# Patient Record
Sex: FEMALE | Race: OTHER | NOT HISPANIC OR LATINO | Employment: OTHER | URBAN - NONMETROPOLITAN AREA
[De-identification: names, ages, dates, MRNs, and addresses within clinical notes are randomized per-mention and may not be internally consistent; named-entity substitution may affect disease eponyms.]

---

## 2022-12-07 LAB — HUMAN PAPILLOMAVIRUS (HPV): POSITIVE

## 2022-12-14 ENCOUNTER — HISTORICAL (OUTPATIENT)
Dept: ADMINISTRATIVE | Facility: HOSPITAL | Age: 62
End: 2022-12-14

## 2023-01-25 ENCOUNTER — DOCUMENTATION ONLY (OUTPATIENT)
Dept: ADMINISTRATIVE | Facility: HOSPITAL | Age: 63
End: 2023-01-25
Payer: COMMERCIAL

## 2023-02-07 ENCOUNTER — TELEPHONE (OUTPATIENT)
Dept: OBSTETRICS AND GYNECOLOGY | Facility: CLINIC | Age: 63
End: 2023-02-07
Payer: COMMERCIAL

## 2023-02-07 ENCOUNTER — HOSPITAL ENCOUNTER (OUTPATIENT)
Dept: PREADMISSION TESTING | Facility: HOSPITAL | Age: 63
Discharge: HOME | End: 2023-02-07
Payer: COMMERCIAL

## 2023-02-07 VITALS — BODY MASS INDEX: 42.33 KG/M2 | WEIGHT: 230 LBS | HEIGHT: 62 IN

## 2023-02-07 DIAGNOSIS — N93.9 ABNORMAL VAGINAL BLEEDING: Primary | ICD-10-CM

## 2023-02-07 LAB
ANION GAP SERPL CALC-SCNC: 8 MEQ/L (ref 2–13)
APPEARANCE UR: CLEAR
APTT PPP: 27.9 SECONDS (ref 23–29.4)
BASOPHILS # BLD AUTO: 0.04 X10(3)/MCL (ref 0.01–0.08)
BASOPHILS NFR BLD AUTO: 0.5 % (ref 0.1–1.2)
BILIRUB UR QL STRIP.AUTO: NEGATIVE MG/DL
BUN SERPL-MCNC: 14 MG/DL (ref 7–20)
CALCIUM SERPL-MCNC: 9.6 MG/DL (ref 8.4–10.2)
CHLORIDE SERPL-SCNC: 101 MMOL/L (ref 98–110)
CO2 SERPL-SCNC: 31 MMOL/L (ref 21–32)
COLOR UR AUTO: YELLOW
CREAT SERPL-MCNC: 0.67 MG/DL (ref 0.66–1.25)
CREAT/UREA NIT SERPL: 21 (ref 12–20)
EOSINOPHIL # BLD AUTO: 0.1 X10(3)/MCL (ref 0.04–0.36)
EOSINOPHIL NFR BLD AUTO: 1.1 % (ref 0.7–7)
ERYTHROCYTE [DISTWIDTH] IN BLOOD BY AUTOMATED COUNT: 12.6 % (ref 11–14.5)
FIBRINOGEN PPP-MCNC: 443 MG/DL (ref 210–463)
GFR SERPLBLD CREATININE-BSD FMLA CKD-EPI: >90 MLS/MIN/1.73/M2
GLUCOSE SERPL-MCNC: 155 MG/DL (ref 70–115)
GLUCOSE UR QL STRIP.AUTO: NEGATIVE MG/DL
HCT VFR BLD AUTO: 42.7 % (ref 36–48)
HGB BLD-MCNC: 13.6 GM/DL (ref 11.8–16)
IMM GRANULOCYTES # BLD AUTO: 0.03 X10(3)/MCL (ref 0–0.03)
IMM GRANULOCYTES NFR BLD AUTO: 0.3 % (ref 0–0.5)
INR BLD: 0.94
KETONES UR QL STRIP.AUTO: NEGATIVE MG/DL
LEUKOCYTE ESTERASE UR QL STRIP.AUTO: NEGATIVE UNIT/L
LYMPHOCYTES # BLD AUTO: 2.18 X10(3)/MCL (ref 1.16–3.74)
LYMPHOCYTES NFR BLD AUTO: 24.9 % (ref 20–55)
MCH RBC QN AUTO: 26.5 PG (ref 27–34)
MCV RBC AUTO: 83.2 FL (ref 79–99)
MEAN CELL HEMOGLOBIN CONCENTRATION (OHS) G/DL: 31.9 G/DL (ref 31–37)
MONOCYTES # BLD AUTO: 0.39 X10(3)/MCL (ref 0.24–0.36)
MONOCYTES NFR BLD AUTO: 4.5 % (ref 4.7–12.5)
NEUTROPHILS # BLD AUTO: 6 X10(3)/MCL (ref 1.56–6.13)
NEUTROPHILS NFR BLD AUTO: 68.7 % (ref 37–73)
NITRITE UR QL STRIP.AUTO: NEGATIVE
NRBC BLD AUTO-RTO: 0 % (ref 0–1)
PH UR STRIP.AUTO: 6 [PH]
PLATELET # BLD AUTO: 323 X10(3)/MCL (ref 140–371)
PMV BLD AUTO: 10 FL (ref 9.4–12.4)
POTASSIUM SERPL-SCNC: 4.8 MMOL/L (ref 3.5–5.1)
PROT UR QL STRIP.AUTO: NEGATIVE MG/DL
PROTHROMBIN TIME: 9.6 SECONDS (ref 9.3–11.9)
RBC # BLD AUTO: 5.13 X10(6)/MCL (ref 4–5.1)
RBC #/AREA URNS AUTO: ABNORMAL /HPF
RBC UR QL AUTO: ABNORMAL UNIT/L
SODIUM SERPL-SCNC: 140 MMOL/L (ref 135–145)
SP GR UR STRIP.AUTO: 1.02
SQUAMOUS #/AREA URNS AUTO: ABNORMAL /HPF
UROBILINOGEN UR STRIP-ACNC: 0.2 MG/DL
WBC # SPEC AUTO: 8.7 X10(3)/MCL (ref 4–11.5)

## 2023-02-07 PROCEDURE — 85610 PROTHROMBIN TIME: CPT | Performed by: OBSTETRICS & GYNECOLOGY

## 2023-02-07 PROCEDURE — 85384 FIBRINOGEN ACTIVITY: CPT | Performed by: OBSTETRICS & GYNECOLOGY

## 2023-02-07 PROCEDURE — 80048 BASIC METABOLIC PNL TOTAL CA: CPT | Performed by: OBSTETRICS & GYNECOLOGY

## 2023-02-07 PROCEDURE — 85025 COMPLETE CBC W/AUTO DIFF WBC: CPT | Performed by: OBSTETRICS & GYNECOLOGY

## 2023-02-07 PROCEDURE — 81001 URINALYSIS AUTO W/SCOPE: CPT | Performed by: OBSTETRICS & GYNECOLOGY

## 2023-02-07 PROCEDURE — 87389 HIV-1 AG W/HIV-1&-2 AB AG IA: CPT | Performed by: OBSTETRICS & GYNECOLOGY

## 2023-02-07 PROCEDURE — 85730 THROMBOPLASTIN TIME PARTIAL: CPT | Performed by: OBSTETRICS & GYNECOLOGY

## 2023-02-07 PROCEDURE — 86592 SYPHILIS TEST NON-TREP QUAL: CPT | Performed by: OBSTETRICS & GYNECOLOGY

## 2023-02-07 RX ORDER — FAMOTIDINE 20 MG/1
20 TABLET, FILM COATED ORAL
Status: CANCELLED | OUTPATIENT
Start: 2023-02-16

## 2023-02-07 RX ORDER — FAMOTIDINE 10 MG/1
10 TABLET ORAL 2 TIMES DAILY
COMMUNITY

## 2023-02-07 RX ORDER — CEFAZOLIN SODIUM 2 G/50ML
2 SOLUTION INTRAVENOUS
Status: CANCELLED | OUTPATIENT
Start: 2023-02-07

## 2023-02-07 RX ORDER — SODIUM CHLORIDE, SODIUM LACTATE, POTASSIUM CHLORIDE, CALCIUM CHLORIDE 600; 310; 30; 20 MG/100ML; MG/100ML; MG/100ML; MG/100ML
INJECTION, SOLUTION INTRAVENOUS CONTINUOUS
Status: CANCELLED | OUTPATIENT
Start: 2023-02-16

## 2023-02-07 NOTE — DISCHARGE INSTRUCTIONS

## 2023-02-08 LAB — HIV 1+2 AB+HIV1 P24 AG SERPL QL IA: NONREACTIVE

## 2023-02-09 LAB
RPR SER QL: NORMAL
RPR SER-TITR: NORMAL {TITER}

## 2023-02-15 ENCOUNTER — LAB VISIT (OUTPATIENT)
Dept: LAB | Facility: HOSPITAL | Age: 63
End: 2023-02-15
Attending: OBSTETRICS & GYNECOLOGY
Payer: COMMERCIAL

## 2023-02-15 ENCOUNTER — ANESTHESIA EVENT (OUTPATIENT)
Dept: SURGERY | Facility: HOSPITAL | Age: 63
End: 2023-02-15
Payer: COMMERCIAL

## 2023-02-15 DIAGNOSIS — N93.9 ABNORMAL VAGINAL BLEEDING: ICD-10-CM

## 2023-02-15 LAB
ABO AND RH: NORMAL
ABORH RETYPE: NORMAL
ANTIBODY SCREEN: NORMAL

## 2023-02-15 PROCEDURE — 36415 COLL VENOUS BLD VENIPUNCTURE: CPT

## 2023-02-15 PROCEDURE — 86900 BLOOD TYPING SEROLOGIC ABO: CPT | Performed by: OBSTETRICS & GYNECOLOGY

## 2023-02-15 NOTE — ANESTHESIA PREPROCEDURE EVALUATION
02/15/2023  Jany Hathaway is a 62 y.o., female.      Pre-op Assessment    I have reviewed the Patient Summary Reports.     I have reviewed the Nursing Notes. I have reviewed the NPO Status.   I have reviewed the Medications.     Review of Systems  Anesthesia Hx:  No problems with previous Anesthesia  Denies Family Hx of Anesthesia complications.   Denies Personal Hx of Anesthesia complications.   Hematology/Oncology:  Hematology Normal   Oncology Normal     EENT/Dental:EENT/Dental Normal   Cardiovascular:  Cardiovascular Normal Exercise tolerance: good     Pulmonary:  Pulmonary Normal    Renal/:  Renal/ Normal     Hepatic/GI:   GERD    Musculoskeletal:  Musculoskeletal Normal    Neurological:  Neurology Normal    Endocrine:  Endocrine Normal  Morbid Obesity / BMI > 40  Dermatological:  Skin Normal    Psych:  Psychiatric Normal           Physical Exam  General: Well nourished, Cooperative, Alert and Oriented    Airway:  Mallampati: II / II  Mouth Opening: Normal  TM Distance: Normal  Tongue: Normal  Neck ROM: Normal ROM    Dental:  Intact        Anesthesia Plan  Type of Anesthesia, risks & benefits discussed:    Anesthesia Type: Gen ETT  Intra-op Monitoring Plan: Standard ASA Monitors  Post Op Pain Control Plan: multimodal analgesia  Induction:  IV  Airway Plan: Direct  Informed Consent: Informed consent signed with the Patient and all parties understand the risks and agree with anesthesia plan.  All questions answered. Patient consented to blood products? Yes  ASA Score: 3  Day of Surgery Review of History & Physical: H&P Update referred to the surgeon/provider.I have interviewed and examined the patient. I have reviewed the patient's H&P dated: There are no significant changes.     Ready For Surgery From Anesthesia Perspective.     .

## 2023-02-16 ENCOUNTER — HOSPITAL ENCOUNTER (OUTPATIENT)
Facility: HOSPITAL | Age: 63
Discharge: HOME | End: 2023-02-16
Attending: OBSTETRICS & GYNECOLOGY | Admitting: OBSTETRICS & GYNECOLOGY
Payer: COMMERCIAL

## 2023-02-16 ENCOUNTER — ANESTHESIA (OUTPATIENT)
Dept: SURGERY | Facility: HOSPITAL | Age: 63
End: 2023-02-16
Payer: COMMERCIAL

## 2023-02-16 VITALS
BODY MASS INDEX: 42.33 KG/M2 | TEMPERATURE: 97 F | HEART RATE: 66 BPM | HEIGHT: 62 IN | DIASTOLIC BLOOD PRESSURE: 78 MMHG | SYSTOLIC BLOOD PRESSURE: 122 MMHG | RESPIRATION RATE: 18 BRPM | OXYGEN SATURATION: 98 % | WEIGHT: 230 LBS

## 2023-02-16 DIAGNOSIS — R93.89 THICKENED ENDOMETRIUM: Primary | ICD-10-CM

## 2023-02-16 DIAGNOSIS — N93.9 ABNORMAL VAGINAL BLEEDING: ICD-10-CM

## 2023-02-16 PROCEDURE — 58558 HYSTEROSCOPY BIOPSY: CPT | Mod: ,,, | Performed by: OBSTETRICS & GYNECOLOGY

## 2023-02-16 PROCEDURE — D9220A PRA ANESTHESIA: ICD-10-PCS | Mod: ,,, | Performed by: NURSE ANESTHETIST, CERTIFIED REGISTERED

## 2023-02-16 PROCEDURE — 58558 PR HYSTEROSCOPY,W/ENDO BX: ICD-10-PCS | Mod: ,,, | Performed by: OBSTETRICS & GYNECOLOGY

## 2023-02-16 PROCEDURE — 71000033 HC RECOVERY, INTIAL HOUR: Performed by: OBSTETRICS & GYNECOLOGY

## 2023-02-16 PROCEDURE — 63600175 PHARM REV CODE 636 W HCPCS: Performed by: OBSTETRICS & GYNECOLOGY

## 2023-02-16 PROCEDURE — 71000015 HC POSTOP RECOV 1ST HR: Performed by: OBSTETRICS & GYNECOLOGY

## 2023-02-16 PROCEDURE — 25000003 PHARM REV CODE 250: Performed by: OBSTETRICS & GYNECOLOGY

## 2023-02-16 PROCEDURE — 36000707: Performed by: OBSTETRICS & GYNECOLOGY

## 2023-02-16 PROCEDURE — 63600175 PHARM REV CODE 636 W HCPCS: Performed by: NURSE ANESTHETIST, CERTIFIED REGISTERED

## 2023-02-16 PROCEDURE — 36000706: Performed by: OBSTETRICS & GYNECOLOGY

## 2023-02-16 PROCEDURE — 25000003 PHARM REV CODE 250: Performed by: NURSE ANESTHETIST, CERTIFIED REGISTERED

## 2023-02-16 PROCEDURE — D9220A PRA ANESTHESIA: Mod: ,,, | Performed by: NURSE ANESTHETIST, CERTIFIED REGISTERED

## 2023-02-16 PROCEDURE — C1782 MORCELLATOR: HCPCS | Performed by: OBSTETRICS & GYNECOLOGY

## 2023-02-16 PROCEDURE — 37000008 HC ANESTHESIA 1ST 15 MINUTES: Performed by: OBSTETRICS & GYNECOLOGY

## 2023-02-16 PROCEDURE — 37000009 HC ANESTHESIA EA ADD 15 MINS: Performed by: OBSTETRICS & GYNECOLOGY

## 2023-02-16 PROCEDURE — 71000016 HC POSTOP RECOV ADDL HR: Performed by: OBSTETRICS & GYNECOLOGY

## 2023-02-16 RX ORDER — DIPHENHYDRAMINE HYDROCHLORIDE 50 MG/ML
25 INJECTION INTRAMUSCULAR; INTRAVENOUS EVERY 4 HOURS PRN
Status: DISCONTINUED | OUTPATIENT
Start: 2023-02-16 | End: 2023-02-16 | Stop reason: HOSPADM

## 2023-02-16 RX ORDER — SODIUM CHLORIDE, SODIUM LACTATE, POTASSIUM CHLORIDE, CALCIUM CHLORIDE 600; 310; 30; 20 MG/100ML; MG/100ML; MG/100ML; MG/100ML
INJECTION, SOLUTION INTRAVENOUS CONTINUOUS
Status: DISCONTINUED | OUTPATIENT
Start: 2023-02-16 | End: 2023-02-16 | Stop reason: HOSPADM

## 2023-02-16 RX ORDER — LIDOCAINE HYDROCHLORIDE 20 MG/ML
INJECTION INTRAVENOUS
Status: DISCONTINUED | OUTPATIENT
Start: 2023-02-16 | End: 2023-02-16

## 2023-02-16 RX ORDER — GLYCOPYRROLATE 0.2 MG/ML
0.2 INJECTION INTRAMUSCULAR; INTRAVENOUS
Status: COMPLETED | OUTPATIENT
Start: 2023-02-16 | End: 2023-02-16

## 2023-02-16 RX ORDER — ONDANSETRON 2 MG/ML
INJECTION INTRAMUSCULAR; INTRAVENOUS
Status: DISCONTINUED | OUTPATIENT
Start: 2023-02-16 | End: 2023-02-16

## 2023-02-16 RX ORDER — PROCHLORPERAZINE EDISYLATE 5 MG/ML
5 INJECTION INTRAMUSCULAR; INTRAVENOUS EVERY 6 HOURS PRN
Status: DISCONTINUED | OUTPATIENT
Start: 2023-02-16 | End: 2023-02-16 | Stop reason: HOSPADM

## 2023-02-16 RX ORDER — FENTANYL CITRATE 50 UG/ML
INJECTION, SOLUTION INTRAMUSCULAR; INTRAVENOUS
Status: DISCONTINUED | OUTPATIENT
Start: 2023-02-16 | End: 2023-02-16

## 2023-02-16 RX ORDER — FAMOTIDINE 20 MG/1
20 TABLET, FILM COATED ORAL
Status: DISCONTINUED | OUTPATIENT
Start: 2023-02-16 | End: 2023-02-16 | Stop reason: HOSPADM

## 2023-02-16 RX ORDER — OXYCODONE AND ACETAMINOPHEN 10; 325 MG/1; MG/1
1 TABLET ORAL EVERY 4 HOURS PRN
Status: DISCONTINUED | OUTPATIENT
Start: 2023-02-16 | End: 2023-02-16 | Stop reason: HOSPADM

## 2023-02-16 RX ORDER — MIDAZOLAM HYDROCHLORIDE 1 MG/ML
2 INJECTION INTRAMUSCULAR; INTRAVENOUS
Status: COMPLETED | OUTPATIENT
Start: 2023-02-16 | End: 2023-02-16

## 2023-02-16 RX ORDER — KETOROLAC TROMETHAMINE 30 MG/ML
INJECTION, SOLUTION INTRAMUSCULAR; INTRAVENOUS
Status: DISCONTINUED | OUTPATIENT
Start: 2023-02-16 | End: 2023-02-16

## 2023-02-16 RX ORDER — PROPOFOL 10 MG/ML
VIAL (ML) INTRAVENOUS
Status: DISCONTINUED | OUTPATIENT
Start: 2023-02-16 | End: 2023-02-16

## 2023-02-16 RX ORDER — VECURONIUM BROMIDE FOR INJECTION 1 MG/ML
INJECTION, POWDER, LYOPHILIZED, FOR SOLUTION INTRAVENOUS
Status: DISCONTINUED | OUTPATIENT
Start: 2023-02-16 | End: 2023-02-16

## 2023-02-16 RX ORDER — DIPHENHYDRAMINE HCL 25 MG
25 CAPSULE ORAL EVERY 4 HOURS PRN
Status: DISCONTINUED | OUTPATIENT
Start: 2023-02-16 | End: 2023-02-16 | Stop reason: HOSPADM

## 2023-02-16 RX ORDER — HYDROMORPHONE HYDROCHLORIDE 1 MG/ML
1 INJECTION, SOLUTION INTRAMUSCULAR; INTRAVENOUS; SUBCUTANEOUS EVERY 6 HOURS PRN
Status: DISCONTINUED | OUTPATIENT
Start: 2023-02-16 | End: 2023-02-16 | Stop reason: HOSPADM

## 2023-02-16 RX ORDER — ONDANSETRON 4 MG/1
8 TABLET, ORALLY DISINTEGRATING ORAL EVERY 8 HOURS PRN
Status: DISCONTINUED | OUTPATIENT
Start: 2023-02-16 | End: 2023-02-16 | Stop reason: HOSPADM

## 2023-02-16 RX ORDER — KETAMINE HYDROCHLORIDE 10 MG/ML
INJECTION, SOLUTION INTRAMUSCULAR; INTRAVENOUS
Status: DISCONTINUED | OUTPATIENT
Start: 2023-02-16 | End: 2023-02-16

## 2023-02-16 RX ORDER — NEOSTIGMINE METHYLSULFATE 1 MG/ML
INJECTION, SOLUTION INTRAVENOUS
Status: DISCONTINUED | OUTPATIENT
Start: 2023-02-16 | End: 2023-02-16

## 2023-02-16 RX ORDER — HYDROCODONE BITARTRATE AND ACETAMINOPHEN 5; 325 MG/1; MG/1
1 TABLET ORAL EVERY 4 HOURS PRN
Status: DISCONTINUED | OUTPATIENT
Start: 2023-02-16 | End: 2023-02-16 | Stop reason: HOSPADM

## 2023-02-16 RX ORDER — CEFAZOLIN SODIUM 2 G/50ML
2 SOLUTION INTRAVENOUS
Status: COMPLETED | OUTPATIENT
Start: 2023-02-16 | End: 2023-02-16

## 2023-02-16 RX ORDER — SODIUM CHLORIDE, SODIUM LACTATE, POTASSIUM CHLORIDE, CALCIUM CHLORIDE 600; 310; 30; 20 MG/100ML; MG/100ML; MG/100ML; MG/100ML
100 INJECTION, SOLUTION INTRAVENOUS CONTINUOUS
Status: DISCONTINUED | OUTPATIENT
Start: 2023-02-16 | End: 2023-02-16 | Stop reason: HOSPADM

## 2023-02-16 RX ORDER — IBUPROFEN 600 MG/1
600 TABLET ORAL EVERY 6 HOURS PRN
Status: DISCONTINUED | OUTPATIENT
Start: 2023-02-16 | End: 2023-02-16 | Stop reason: HOSPADM

## 2023-02-16 RX ADMIN — FENTANYL CITRATE 50 MCG: 50 INJECTION, SOLUTION INTRAMUSCULAR; INTRAVENOUS at 07:02

## 2023-02-16 RX ADMIN — GLYCOPYRROLATE 0.2 MG: 0.2 INJECTION INTRAMUSCULAR; INTRAVENOUS at 06:02

## 2023-02-16 RX ADMIN — NEOSTIGMINE METHYLSULFATE 3 MG: 0.5 INJECTION INTRAVENOUS at 07:02

## 2023-02-16 RX ADMIN — CEFAZOLIN SODIUM 2 G: 2 SOLUTION INTRAVENOUS at 07:02

## 2023-02-16 RX ADMIN — FAMOTIDINE 20 MG: 20 TABLET, FILM COATED ORAL at 06:02

## 2023-02-16 RX ADMIN — SODIUM CHLORIDE, POTASSIUM CHLORIDE, SODIUM LACTATE AND CALCIUM CHLORIDE: 600; 310; 30; 20 INJECTION, SOLUTION INTRAVENOUS at 06:02

## 2023-02-16 RX ADMIN — KETAMINE HYDROCHLORIDE 10 MG: 10 INJECTION INTRAMUSCULAR; INTRAVENOUS at 07:02

## 2023-02-16 RX ADMIN — VECURONIUM BROMIDE 4.5 MG: 10 INJECTION, POWDER, FOR SOLUTION INTRAVENOUS at 06:02

## 2023-02-16 RX ADMIN — KETAMINE HYDROCHLORIDE 10 MG: 10 INJECTION INTRAMUSCULAR; INTRAVENOUS at 06:02

## 2023-02-16 RX ADMIN — ONDANSETRON 4 MG: 2 INJECTION INTRAMUSCULAR; INTRAVENOUS at 06:02

## 2023-02-16 RX ADMIN — PROPOFOL 140 MG: 10 INJECTION, EMULSION INTRAVENOUS at 06:02

## 2023-02-16 RX ADMIN — MIDAZOLAM HYDROCHLORIDE 2 MG: 1 INJECTION, SOLUTION INTRAMUSCULAR; INTRAVENOUS at 06:02

## 2023-02-16 RX ADMIN — LIDOCAINE HYDROCHLORIDE 75 MG: 20 INJECTION, SOLUTION INTRAVENOUS at 06:02

## 2023-02-16 RX ADMIN — FENTANYL CITRATE 100 MCG: 50 INJECTION, SOLUTION INTRAMUSCULAR; INTRAVENOUS at 06:02

## 2023-02-16 RX ADMIN — GLYCOPYRROLATE 0.4 MG: 0.2 INJECTION, SOLUTION INTRAMUSCULAR; INTRAVITREAL at 07:02

## 2023-02-16 RX ADMIN — KETOROLAC TROMETHAMINE 30 MG: 30 INJECTION, SOLUTION INTRAMUSCULAR; INTRAVENOUS at 07:02

## 2023-02-16 NOTE — DISCHARGE INSTRUCTIONS
DECREASED ACTIVITY TODAY, NO HEAVY LIFTING OR STRAINING, NO PUSHING OR PULLING, DON'T OPERATE MACHINERY/VEHICLE IN 24 HOURS, NO DRIVING TODAY OR WHILE TAKING PAIN MEDS, NO INTERCOURSE, DOUCHING, OR TAMPONS, SHOWERS ONLY, NO TUB BATHS, DECREASED ACTIVITY UNTIL AFTER APPOINTMENT

## 2023-02-16 NOTE — ANESTHESIA POSTPROCEDURE EVALUATION
Anesthesia Post Evaluation    Patient: Jany Hathaway    Procedure(s) Performed: Procedure(s) (LRB):  HYSTEROSCOPY, WITH DILATION AND CURETTAGE OF UTERUS (N/A)    Final Anesthesia Type: general      Patient location during evaluation: PACU  Patient participation: Yes- Able to Participate  Level of consciousness: awake and alert, awake and oriented  Post-procedure vital signs: reviewed and stable  Pain management: adequate  Airway patency: patent    PONV status at discharge: No PONV  Anesthetic complications: no      Cardiovascular status: blood pressure returned to baseline  Respiratory status: unassisted, room air and spontaneous ventilation  Hydration status: euvolemic  Follow-up not needed.          Vitals Value Taken Time   /73 02/16/23 0609   Temp 36.6 °C (97.9 °F) 02/16/23 0609   Pulse 68 02/16/23 0739   Resp 20 02/16/23 0609   SpO2 96 % 02/16/23 0739   Vitals shown include unvalidated device data.      No case tracking events are documented in the log.      Pain/Ayaz Score: No data recorded

## 2023-02-16 NOTE — BRIEF OP NOTE
[facility]  Brief Operative Note    Surgery Date: 2/16/2023     Surgeon(s) and Role:     * Isac Don MD - Primary    Assisting Surgeon: None    Pre-op Diagnosis:  Thickened endometrium [R93.89]    Post-op Diagnosis:  Post-Op Diagnosis Codes:     * Thickened endometrium [R93.89]     * Endometrial polyp [N84.0]     * Lesion of cervix [N88.9]    Procedure(s) (LRB):  HYSTEROSCOPY, WITH DILATION AND CURETTAGE OF UTERUS (N/A)    Anesthesia: General    Operative Findings: see op note    Estimated Blood Loss: * No values recorded between 2/16/2023  7:16 AM and 2/16/2023  7:33 AM *         Specimens:   Specimen (24h ago, onward)      None              Discharge Note    OUTCOME: Patient tolerated treatment/procedure well without complication and is now ready for discharge.    DISPOSITION: Home or Self Care    FINAL DIAGNOSIS:  Thickened endometrium    FOLLOWUP: In clinic    DISCHARGE INSTRUCTIONS:  No discharge procedures on file.

## 2023-02-16 NOTE — OP NOTE
DATE OF PROCEDURE: 2/16/2023    PRE-OP DIAGNOSIS:  Thickened endometrium [R93.89]    POST-OP DIAGNOSIS:  Post-Op Diagnosis Codes:     * Thickened endometrium [R93.89]     * Endometrial polyp [N84.0]     * Lesion of cervix [N88.9]    Procedure:   Hysteroscope Dilation and Curettage  Myosure Polypectomy    Surgeon: Isac Don MD    Anesthesia: General    Estimated Blood Loss:  0 cc    Findings:   Normal vagina.  Cervix poorly mobile.  Distal cervix abnormally adhesed to posterior vaginal at 6 o'clock.  Uterus sounded to 8 cm.  Large endometrial polyp protruding from anterior fundal portion of endometrium.  Otherwise relatively atrophic appearing endometrium noted.     Procedure:   After consent were reviewed, the patient was taken to the operative room and placed on the OR table.  A time out  was held and after general anesthesia was induced.    The patient was placed in the dorsal lithotomy position and prepped and draped in the standard sterile fashion.  A weighted speculum was placed in the posterior vagina.  The cervix was identified and grasped with a single tooth tenaculum.  The uterus was sounded to 8 cm.  The cervix was then dilated to allow passage of a rigid hysteroscope.  The scope was inserted.  The above findings were noted.  Due to its location in the fundus, a Myosure Light device was used to removed the large endometrial polyp in its entirety.  The remainder of the endometrium was sampled with the Myosure as well.  An endocervical curetting was performed and handed off separately. A biopsy was taken of the posterior cervical adhesion and the remainder of the adhesion was released.     At that point, the procedure was terminated.  All instruments were removed.  Counts were correct.  Patient was awakened and taken to the recovery room in stable condition having tolerated the procedure well.

## 2023-02-16 NOTE — DISCHARGE SUMMARY
[facility]  Discharge Note  Short Stay    Procedure(s) (LRB):  HYSTEROSCOPY, WITH DILATION AND CURETTAGE OF UTERUS (N/A)      OUTCOME: Patient tolerated treatment/procedure well without complication and is now ready for discharge.    DISPOSITION: Home or Self Care    FINAL DIAGNOSIS:  Thickened endometrium    FOLLOWUP: In clinic    DISCHARGE INSTRUCTIONS:    Discharge Procedure Orders   Diet general     Call MD for:  temperature >100.4     Call MD for:  persistent nausea and vomiting     Call MD for:  severe uncontrolled pain     Call MD for:  difficulty breathing, headache or visual disturbances     Call MD for:  redness, tenderness, or signs of infection (pain, swelling, redness, odor or green/yellow discharge around incision site)     Call MD for:  hives     Call MD for:  persistent dizziness or light-headedness     Call MD for:  extreme fatigue        TIME SPENT ON DISCHARGE: 5 minutes

## 2023-02-16 NOTE — ANESTHESIA PROCEDURE NOTES
Intubation    Date/Time: 2/16/2023 6:58 AM  Performed by: Zak Carrasquillo CRNA  Authorized by: Zak Carrasquillo CRNA     Intubation:     Induction:  Intravenous    Intubated:  Postinduction    Mask Ventilation:  Easy mask    Attempts:  1    Attempted By:  CRNA    Method of Intubation:  Direct    Blade:  Sharp 3    Laryngeal View Grade: Grade I - full view of cords      Difficult Airway Encountered?: No      Complications:  None    Airway Device:  Oral endotracheal tube    Airway Device Size:  7.0    Style/Cuff Inflation:  Cuffed    Tube secured:  21    Secured at:  The lips    Placement Verified By:  Capnometry    Complicating Factors:  None    Findings Post-Intubation:  BS equal bilateral and atraumatic/condition of teeth unchanged

## 2023-03-03 ENCOUNTER — OFFICE VISIT (OUTPATIENT)
Dept: OBSTETRICS AND GYNECOLOGY | Facility: CLINIC | Age: 63
End: 2023-03-03
Payer: COMMERCIAL

## 2023-03-03 VITALS
HEIGHT: 62 IN | DIASTOLIC BLOOD PRESSURE: 68 MMHG | WEIGHT: 226.88 LBS | TEMPERATURE: 98 F | BODY MASS INDEX: 41.75 KG/M2 | SYSTOLIC BLOOD PRESSURE: 122 MMHG

## 2023-03-03 DIAGNOSIS — N84.0 ENDOMETRIAL POLYP: ICD-10-CM

## 2023-03-03 PROCEDURE — 3008F BODY MASS INDEX DOCD: CPT | Mod: CPTII,,, | Performed by: OBSTETRICS & GYNECOLOGY

## 2023-03-03 PROCEDURE — 99213 PR OFFICE/OUTPT VISIT, EST, LEVL III, 20-29 MIN: ICD-10-PCS | Mod: ,,, | Performed by: OBSTETRICS & GYNECOLOGY

## 2023-03-03 PROCEDURE — 3074F PR MOST RECENT SYSTOLIC BLOOD PRESSURE < 130 MM HG: ICD-10-PCS | Mod: CPTII,,, | Performed by: OBSTETRICS & GYNECOLOGY

## 2023-03-03 PROCEDURE — 99213 OFFICE O/P EST LOW 20 MIN: CPT | Mod: ,,, | Performed by: OBSTETRICS & GYNECOLOGY

## 2023-03-03 PROCEDURE — 3078F PR MOST RECENT DIASTOLIC BLOOD PRESSURE < 80 MM HG: ICD-10-PCS | Mod: CPTII,,, | Performed by: OBSTETRICS & GYNECOLOGY

## 2023-03-03 PROCEDURE — 1159F PR MEDICATION LIST DOCUMENTED IN MEDICAL RECORD: ICD-10-PCS | Mod: CPTII,,, | Performed by: OBSTETRICS & GYNECOLOGY

## 2023-03-03 PROCEDURE — 3074F SYST BP LT 130 MM HG: CPT | Mod: CPTII,,, | Performed by: OBSTETRICS & GYNECOLOGY

## 2023-03-03 PROCEDURE — 3078F DIAST BP <80 MM HG: CPT | Mod: CPTII,,, | Performed by: OBSTETRICS & GYNECOLOGY

## 2023-03-03 PROCEDURE — 1159F MED LIST DOCD IN RCRD: CPT | Mod: CPTII,,, | Performed by: OBSTETRICS & GYNECOLOGY

## 2023-03-03 PROCEDURE — 3008F PR BODY MASS INDEX (BMI) DOCUMENTED: ICD-10-PCS | Mod: CPTII,,, | Performed by: OBSTETRICS & GYNECOLOGY

## 2023-03-03 RX ORDER — MEDROXYPROGESTERONE ACETATE 10 MG/1
10 TABLET ORAL DAILY
Qty: 10 TABLET | Refills: 2 | Status: SHIPPED | OUTPATIENT
Start: 2023-03-03 | End: 2024-01-09

## 2023-03-03 NOTE — PROGRESS NOTES
HPI:   62 y.o. F s/p Hyst D&C on 2023 for Thickened endometrium here for postop visit.     Pathology:   Endometrium and polyp:  Endometrial polyp, benign  Endocervix:  Scant benign endocervical epithelium and benign squamous epithelial cells  No definitive dysplastic cells identified  Cervix biopsy at 6:00:  Benign squamous mucosa  Negative for dysplasia       Per previous note:  Chief Complaint   Follow up ECC results. Pre-admit GYN. History of Present Illness 61yo WF  here today for pathology results and Pre admit for hyst d&C for thickened ES.    Pathology:  ECC:  Endocervical curettage:  Very few benign squamous epithelial cells  no definitive dysplastic cells identified    23:  Chief Complaint  colpo..last pap 22=ascus + HR hpv  History of Present Illness  61yo WF here today for Colpo due to ASCUS PAP +HR HPV  Discussion of TVUS, MMG, Breast U/S    22 TVUS  US pelvis:  - Uterus: 7.3 x 4.0 x 4.9  - ES:2.1cm  - ROV:1.6 x 1.5 x 0.6cm  - LOV:2.2 x 1.0 x 1.2cm  - Free fluid: none noted  22 MMG:  Subtle asymmetric stromal thickening is noted within the upper-outer quadrant of the right breast and correlated fairly well in location with the suspicious nodule noted within the 9 o'clock position of the right breast on the ultrasound also dated 22. there is no obvious, discrete mass noted mammographically as described on the patient's ultrasound  Summary findings:  BI-RADS 4 suspicious for malignancy (much more clearly delineated on the ultrasound of the right breast also dated 22)  Recommendations:  Surgical consultation is recommended. It should be noted that the lesion in questions is much more clearly delineated on ultrasound and ultrasound-guided needle or a mag seed localization prior to surgical excision could be performed if felt to be clinically indicated.  Ultrasound of right breast:  Impression:  1.1cm densely echogenic nodule resulting in intense posterior shadowing  "(taller than wide nodule) is noted within the 9 o'clock position of the right breast with no obvious, corresponding lesion noted on the patient mammogram dated 12/14/22. this finding is suspicious for a breast cancer (BI-RADS category 4/suspicious abnormality) and surgical consultation is recommended. it should be noted that mag seed or wire localization of this nodule could easily be performed under ultrasound guidance for surgical resection    ROS:  General/Constitutional:  Chills denies. Fatigue/weakness denies. Fever denies . Night sweats denies .  Gastrointestinal:  Abdominal pain denies. Blood in stool denies. Constipation denies. Diarrhea denies. Heartburn denies. Nausea denies. Vomiting denies  Genitourinary:  Incontinence denies. Blood in urine denies. Frequent urination denies. Urgency denies. Painful urination denies.  Gynecologic:  Irregular menses denies. Heavy bleeding denies. Painful menses denies. Vaginal discharge denies. Vaginal odor denies. Vaginal lesion denies. Pelvic pain denies. Decreased libido denies. Vulvar lesion denies. Prolapse of genital organs denies. Painful intercourse denies.    Physical Exam:   /68   Temp 97.7 °F (36.5 °C)   Ht 5' 1.81" (1.57 m)   Wt 102.9 kg (226 lb 13.7 oz)   BMI 41.75 kg/m²       Assessment:   1. Endometrial polyp    Hysteroscopy, With Dilation And Curettage Of Uterus       Plan:   Reviewed pathology  May return to normal activity  Recommendation of cyclic provera x3 months. If absent bleeding following 3 months, will discontinue medication. Denies hx of DVT.   Rx: Provera 10mg x10 days to begin April 1st   To keep journal of bleeding patterns   Continue healthy diet and exercise.   Discussed weight additional risk factor with possible future hysterectomy.     RTC 4 months for medication f/u   "

## 2023-04-18 ENCOUNTER — TELEPHONE (OUTPATIENT)
Dept: OBSTETRICS AND GYNECOLOGY | Facility: CLINIC | Age: 63
End: 2023-04-18
Payer: COMMERCIAL

## 2023-04-18 NOTE — TELEPHONE ENCOUNTER
----- Message from Tootie Modi sent at 4/18/2023  9:21 AM CDT -----  Regarding: Nurse call  .Type:  Needs Medical Advice    Who Called: patient  Best Call Back Number:  [phone]  Additional Information: came to clinic wanting to know why she was prescribed medroxyProgesterone; read her last visit to her but still has questions for nurse

## 2023-12-27 NOTE — PROGRESS NOTES
Chief Complaint: Annual exam    HPI:   Jany Hathaway is a 63 y.o. year old  here for her Annual Exam.  H/o endometrial polyp noted on D&C last year.  Polyp was initially identified incidentally due to thickened ES of 2.1 cm on pelvic US performed due to family history (sister) of ovarian cancer.  She was given cyclic provera and was to follow up in 3-4 months, but was lost to follow until now.  She reports withdrawal bleeding each month after provera, but has not taken it in 6 months since her rx .  Denies bleeding since last withdrawal bleed.    Sister dx with ovarian cancer in her late 30s.       She has h/o GEORGETTE 3 and LEEP in 2017.  Pap in 2022 was ASCUS + HR HPV with benign colpo.     MENOPAUSAL:  Age at menarche: pt does not remember  Age at menopause: 50  Hysterectomy:  No  Last pap: 22 ASCUS w/+HPV Colpo 23  H/o abnl pap: yes  Postcoital Bleeding: No  Sexually Active: not currently   Dyspareunia: No  H/o STI: HPV  HRT: none  MM22 benign  H/o abnl MMG: no  Col          Past Medical History:   Diagnosis Date    Abnormal Pap smear of cervix     GERD (gastroesophageal reflux disease)      Past Surgical History:   Procedure Laterality Date    BILATERAL TUBAL LIGATION Bilateral     CERVICAL BIOPSY  W/ LOOP ELECTRODE EXCISION      HYSTEROSCOPY WITH DILATION AND CURETTAGE OF UTERUS N/A 2023    Procedure: HYSTEROSCOPY, WITH DILATION AND CURETTAGE OF UTERUS;  Surgeon: Isac Don MD;  Location: [facility] OR;  Service: OB/GYN;  Laterality: N/A;       Current Outpatient Medications:     famotidine (PEPCID) 10 MG tablet, Take 10 mg by mouth 2 (two) times daily., Disp: , Rfl:     medroxyPROGESTERone (PROVERA) 10 MG tablet, Take 1 tablet (10 mg total) by mouth once daily. for 10 days, Disp: 10 tablet, Rfl: 2  Review of patient's allergies indicates:  No Known Allergies  OB History    Para Term  AB Living   1 1 1     1   SAB IAB Ectopic Multiple Live Births           1       # Outcome Date GA Lbr Migel/2nd Weight Sex Delivery Anes PTL Lv   1 Term 08/11/80 40w0d  3.118 kg (6 lb 14 oz) F Vag-Spont None  JUSTO     Social History     Tobacco Use    Smoking status: Never    Smokeless tobacco: Never   Substance Use Topics    Alcohol use: Never    Drug use: Never     Family History   Problem Relation Age of Onset    Heart failure Mother     Pancreatic cancer Sister     Ovarian cancer Sister     Breast cancer Sister        Review of Systems:   Review of Systems   Constitutional:  Negative for appetite change, chills, fatigue, fever and unexpected weight change.   Eyes:  Negative for visual disturbance.   Respiratory:  Negative for cough, shortness of breath and wheezing.    Cardiovascular:  Negative for chest pain, palpitations and leg swelling.   Gastrointestinal:  Negative for abdominal pain, bloating, blood in stool, constipation, diarrhea, nausea, vomiting, reflux and fecal incontinence.   Endocrine: Negative for hair loss and hot flashes.   Genitourinary:  Negative for bladder incontinence, decreased libido, dysmenorrhea, dyspareunia, dysuria, flank pain, frequency, genital sores, hematuria, hot flashes, menorrhagia, menstrual problem, pelvic pain, urgency, vaginal bleeding, vaginal discharge, vaginal pain, urinary incontinence, postcoital bleeding, postmenopausal bleeding, vaginal dryness and vaginal odor.   Integumentary:  Negative for rash, acne, hair changes, breast mass, nipple discharge, breast skin changes and breast tenderness.   Neurological:  Negative for headaches.   Psychiatric/Behavioral:  Negative for depression.    Breast: Negative for asymmetry, breast self exam, lump, mass, mastodynia, nipple discharge, skin changes and tenderness       Physical Exam:  /64 (BP Location: Right arm, Patient Position: Sitting, BP Method: Medium (Manual))   Temp 96.8 °F (36 °C) (Temporal)   Wt 99.1 kg (218 lb 7.6 oz)   BMI 40.20 kg/m²       Physical Exam:   Constitutional: She is  oriented to person, place, and time. She appears well-developed and well-nourished.   Obese, BMI 40    HENT:   Head: Normocephalic.      Cardiovascular:       Exam reveals no edema.        Pulmonary/Chest: Effort normal. She exhibits no mass, no tenderness, no bony tenderness, no deformity and no retraction. Right breast exhibits no inverted nipple, no mass, no nipple discharge, no skin change, no tenderness, no bleeding, no swelling, no mastectomy, no augmentation and no lumpectomy. Left breast exhibits no inverted nipple, no mass, no nipple discharge, no skin change, no tenderness, no bleeding, no swelling, no mastectomy, no augmentation and no lumpectomy. Breasts are symmetrical.        Abdominal: Soft. She exhibits no distension and no mass. There is no abdominal tenderness. There is no rebound and no guarding. No hernia. Hernia confirmed negative in the right inguinal area.     Genitourinary:    Inguinal canal, vagina, uterus, right adnexa, left adnexa and rectum normal.   Rectum:      No anal fissure or external hemorrhoid.   The external female genitalia was normal.   No external genitalia lesions identified,Genitalia hair distrobution normal .     Labial bartholins normal.There is no rash, tenderness, lesion or injury on the right labia. There is no rash, tenderness, lesion or injury on the left labia. Cervix is normal. No no masses or organomegaly. Right adnexum displays no mass, no tenderness and no fullness. Left adnexum displays no mass, no tenderness and no fullness. Vagina exhibits no lesion. No erythema, vaginal discharge, tenderness, bleeding, rectocele, cystocele or prolapse of vaginal walls in the vagina.    No foreign body in the vagina.      No signs of injury in the vagina.   Vagina was moist.Cervix exhibits no motion tenderness, no lesion, no discharge, no friability, no tenderness and no polyp. Uterus consistancy normal and Uerus contour normal  Uterus is not deviated, not enlarged, not fixed,  not tender, not hosting fibroids and no uterine prolapse. Normal urethral meatus.Urethral Meatus exhibits: urethral lesionUrethra findings: no urethral mass, no tenderness and prolapsedBladder findings: no bladder tenderness          Musculoskeletal: Normal range of motion.      Lymphadenopathy: No inguinal adenopathy noted on the right or left side.    Neurological: She is alert and oriented to person, place, and time.    Skin: Skin is warm and dry.    Psychiatric: She has a normal mood and affect. Her behavior is normal. Judgment and thought content normal.        Assessment:   Annual Well Women Exam  1. Encounter for well woman exam with abnormal findings  - Liquid-Based Pap Smear, Screening Screening    2. Endometrial polyp  - US Pelvis Comp with Transvag NON-OB (xpd; Future  - Liquid-Based Pap Smear, Screening Screening    3. BMI 40.0-44.9, adult    4. History of loop electrical excision procedure (LEEP)        Plan:  Pap Done w HPV  Breast Self-awareness  Recommend annual mammogram  Keep f/u with breast specialist  Recommend exercise at least 3 times weekly  Healthy, balanced diet  Keep yearly follow up with PCP   Will repeat TVUS to reassess ES. If still thickened, will required either progestin therapy or surgical management for endometrial protection  RTC 2 weeks

## 2023-12-28 ENCOUNTER — OFFICE VISIT (OUTPATIENT)
Dept: OBSTETRICS AND GYNECOLOGY | Facility: CLINIC | Age: 63
End: 2023-12-28
Payer: COMMERCIAL

## 2023-12-28 VITALS
DIASTOLIC BLOOD PRESSURE: 64 MMHG | WEIGHT: 218.5 LBS | TEMPERATURE: 97 F | BODY MASS INDEX: 40.2 KG/M2 | SYSTOLIC BLOOD PRESSURE: 122 MMHG

## 2023-12-28 DIAGNOSIS — N84.0 ENDOMETRIAL POLYP: ICD-10-CM

## 2023-12-28 DIAGNOSIS — Z98.890 HISTORY OF LOOP ELECTRICAL EXCISION PROCEDURE (LEEP): ICD-10-CM

## 2023-12-28 DIAGNOSIS — Z01.411 ENCOUNTER FOR WELL WOMAN EXAM WITH ABNORMAL FINDINGS: Primary | ICD-10-CM

## 2023-12-28 PROCEDURE — 99396 PREV VISIT EST AGE 40-64: CPT | Mod: ,,, | Performed by: OBSTETRICS & GYNECOLOGY

## 2023-12-28 PROCEDURE — 99396 PR PREVENTIVE VISIT,EST,40-64: ICD-10-PCS | Mod: ,,, | Performed by: OBSTETRICS & GYNECOLOGY

## 2023-12-28 PROCEDURE — 3078F PR MOST RECENT DIASTOLIC BLOOD PRESSURE < 80 MM HG: ICD-10-PCS | Mod: CPTII,,, | Performed by: OBSTETRICS & GYNECOLOGY

## 2023-12-28 PROCEDURE — 1159F MED LIST DOCD IN RCRD: CPT | Mod: CPTII,,, | Performed by: OBSTETRICS & GYNECOLOGY

## 2023-12-28 PROCEDURE — 3074F SYST BP LT 130 MM HG: CPT | Mod: CPTII,,, | Performed by: OBSTETRICS & GYNECOLOGY

## 2023-12-28 PROCEDURE — 3074F PR MOST RECENT SYSTOLIC BLOOD PRESSURE < 130 MM HG: ICD-10-PCS | Mod: CPTII,,, | Performed by: OBSTETRICS & GYNECOLOGY

## 2023-12-28 PROCEDURE — 1159F PR MEDICATION LIST DOCUMENTED IN MEDICAL RECORD: ICD-10-PCS | Mod: CPTII,,, | Performed by: OBSTETRICS & GYNECOLOGY

## 2023-12-28 PROCEDURE — 3008F PR BODY MASS INDEX (BMI) DOCUMENTED: ICD-10-PCS | Mod: CPTII,,, | Performed by: OBSTETRICS & GYNECOLOGY

## 2023-12-28 PROCEDURE — 3078F DIAST BP <80 MM HG: CPT | Mod: CPTII,,, | Performed by: OBSTETRICS & GYNECOLOGY

## 2023-12-28 PROCEDURE — 3008F BODY MASS INDEX DOCD: CPT | Mod: CPTII,,, | Performed by: OBSTETRICS & GYNECOLOGY

## 2024-01-02 ENCOUNTER — TELEPHONE (OUTPATIENT)
Dept: OBSTETRICS AND GYNECOLOGY | Facility: CLINIC | Age: 64
End: 2024-01-02
Payer: MEDICAID

## 2024-01-02 NOTE — TELEPHONE ENCOUNTER
"Per Dr. Don "Pap ordered. Used endocervical brush to ensure I had a sufficient specimen." Notified Carolina. Carolina verbalized understanding.  "

## 2024-01-04 LAB — PSYCHE PATHOLOGY RESULT: NORMAL

## 2024-01-05 ENCOUNTER — HOSPITAL ENCOUNTER (OUTPATIENT)
Dept: RADIOLOGY | Facility: HOSPITAL | Age: 64
Discharge: HOME | End: 2024-01-05
Attending: OBSTETRICS & GYNECOLOGY
Payer: MEDICAID

## 2024-01-05 DIAGNOSIS — N84.0 ENDOMETRIAL POLYP: ICD-10-CM

## 2024-01-05 PROCEDURE — 76830 TRANSVAGINAL US NON-OB: CPT | Mod: TC

## 2024-01-09 ENCOUNTER — OFFICE VISIT (OUTPATIENT)
Dept: OBSTETRICS AND GYNECOLOGY | Facility: CLINIC | Age: 64
End: 2024-01-09
Payer: MEDICAID

## 2024-01-09 VITALS
SYSTOLIC BLOOD PRESSURE: 126 MMHG | TEMPERATURE: 97 F | BODY MASS INDEX: 40.16 KG/M2 | WEIGHT: 218.25 LBS | DIASTOLIC BLOOD PRESSURE: 68 MMHG

## 2024-01-09 DIAGNOSIS — R93.89 THICKENED ENDOMETRIUM: Primary | ICD-10-CM

## 2024-01-09 PROCEDURE — 99213 OFFICE O/P EST LOW 20 MIN: CPT | Mod: ,,, | Performed by: OBSTETRICS & GYNECOLOGY

## 2024-01-09 PROCEDURE — 3078F DIAST BP <80 MM HG: CPT | Mod: CPTII,,, | Performed by: OBSTETRICS & GYNECOLOGY

## 2024-01-09 PROCEDURE — 3008F BODY MASS INDEX DOCD: CPT | Mod: CPTII,,, | Performed by: OBSTETRICS & GYNECOLOGY

## 2024-01-09 PROCEDURE — 3074F SYST BP LT 130 MM HG: CPT | Mod: CPTII,,, | Performed by: OBSTETRICS & GYNECOLOGY

## 2024-01-09 RX ORDER — MEDROXYPROGESTERONE ACETATE 10 MG/1
10 TABLET ORAL DAILY
Qty: 10 TABLET | Refills: 3 | Status: SHIPPED | OUTPATIENT
Start: 2024-01-09

## 2024-01-09 NOTE — PROGRESS NOTES
Chief Complaint     Follow-up (F/u on u/s done on 24)    HPI:     Patient is a 63 y.o.  presents to follow up PAP, ultrasound for endometrial polyp.     MENOPAUSAL:  Age at menarche: pt does not remember  Age at menopause: 50  Hysterectomy:  No  Last pap: 23 neg w/ neg HPV  H/o abnl pap: yes  Postcoital Bleeding: No  Sexually Active: not currently   Dyspareunia: No  H/o STI: HPV  HRT: none  MM22 benign  H/o abnl MMG: no  Colonoscopy:  never    23:  PAP: WNL, negative HPV     24:  FINDINGS:  Uterus: The uterus measures 6.7 x 3.6 x 4.5 cm with the myometrium having a mildly heterogeneous echogenic appearance with no worrisome underlying masses.  The endometrial stripe measures 6 mm in AP thickness.  Ovaries: The right ovary measures 2.0 x 1.5 x 1.8 cm in the left ovary measures 1.8 x 1.8 x 1.6 cm with no worrisome ovarian or adnexal abnormalities appreciated.  Miscellaneous: There is no evidence of free fluid within the pelvis and the patient was not tender during the examination.  Impression:  1. No significant abnormalities are noted.  See above comments.    Per previous note 23:  Jany Hathaway is a 63 y.o. year old  here for her Annual Exam.  H/o endometrial polyp noted on D&C last year.  Polyp was initially identified incidentally due to thickened ES of 2.1 cm on pelvic US performed due to family history (sister) of ovarian cancer.  She was given cyclic provera and was to follow up in 3-4 months, but was lost to follow until now.  She reports withdrawal bleeding each month after provera, but has not taken it in 6 months since her rx .  Denies bleeding since last withdrawal bleed.  Sister dx with ovarian cancer in her late 30s.   She has h/o GEORGETTE 3 and LEEP in 2017.  Pap in 2022 was ASCUS + HR HPV with benign colpo.     Past Medical History:   Diagnosis Date    Abnormal Pap smear of cervix     GERD (gastroesophageal reflux disease)     High grade  squamous intraepithelial lesion (HGSIL), grade 3 GOERGETTE, on biopsy of cervix     Injury to breast     Lump of right breast        Past Surgical History:   Procedure Laterality Date    BILATERAL TUBAL LIGATION Bilateral 1995    CERVICAL BIOPSY  W/ LOOP ELECTRODE EXCISION  2017    COLPOSCOPY  2023    [facility] biopsy of cervix  2017    Endocervical biopsy  2017    HYSTEROSCOPY WITH DILATION AND CURETTAGE OF UTERUS N/A 2023    Procedure: HYSTEROSCOPY, WITH DILATION AND CURETTAGE OF UTERUS;  Surgeon: Isac Don MD;  Location: [facility] OR;  Service: OB/GYN;  Laterality: N/A;       Family History   Problem Relation Age of Onset    Heart failure Mother     Pancreatic cancer Sister     Ovarian cancer Sister     Breast cancer Sister     Colon cancer Neg Hx     Cervical cancer Neg Hx        OB History          1    Para   1    Term   1            AB        Living   1         SAB        IAB        Ectopic        Multiple        Live Births   1                 Current Outpatient Medications on File Prior to Visit   Medication Sig Dispense Refill    famotidine (PEPCID) 10 MG tablet Take 10 mg by mouth 2 (two) times daily.      [DISCONTINUED] medroxyPROGESTERone (PROVERA) 10 MG tablet Take 1 tablet (10 mg total) by mouth once daily. for 10 days 10 tablet 2     No current facility-administered medications on file prior to visit.       Review of Systems:       Review of Systems   Constitutional:  Negative for chills and fever.   Gastrointestinal:  Negative for abdominal pain, constipation and diarrhea.   Genitourinary:  Negative for bladder incontinence, decreased libido, dysmenorrhea, dyspareunia, dysuria, flank pain, frequency, genital sores, hematuria, hot flashes, menorrhagia, menstrual problem, pelvic pain, urgency, vaginal bleeding, vaginal discharge, vaginal pain, urinary incontinence, postcoital bleeding, postmenopausal bleeding, vaginal dryness and vaginal odor.        Physical Exam:     /68 (BP Location: Right arm, Patient Position: Sitting, BP Method: Large (Manual))   Temp 96.6 °F (35.9 °C) (Temporal)   Wt 99 kg (218 lb 4.1 oz)   BMI 40.16 kg/m²     Physical Exam   Deferred     Assessment:   1. Thickened endometrium    Other orders  -     Cancel: Specimen to Pathology Gynecology and Obstetrics  -     medroxyPROGESTERone (PROVERA) 10 MG tablet; Take 1 tablet (10 mg total) by mouth once daily.  Dispense: 10 tablet; Refill: 3             Plan:   1. Thickened endometrium    Pelvic u/s, PAP reviewed with patient   Recommend progestin for endometrial protection  Discussed cyclic provera vs continuous progestin    Pt desires: Rx Provera 10mg x10 days. Will repeat provera quarterly x1 year.   RTC 6 months for f/u

## 2025-01-06 NOTE — PROGRESS NOTES
Chief Complaint: Annual exam    Chief Complaint   Patient presents with    Well Woman     Here for annual GYN exam.       HPI:   Jany Hathaway is a 64 y.o. year old  here for her Annual Exam.  She underwent hysteroscope D&C for endometrial polyp with pathology showing proliferative endometrium in in 2023.  She was placed on cyclic Provera which she took every 3 months twice.  She was follow up in 6 months but was not able to make that appointment.  She did have withdrawal bleeding after each episode of cyclic Provera.  She has had no bleeding since.    Today she complains of pain in her left shoulder due to a fall last year.  Requests referral to orthopedics.  Also requests referral to Stephanie Ernandez for primary care.    Gyn History:    Menstrual History  Cycle: No  Menarche Age: 14 years    Menopause  Menopause Age: 50 years  Post Menopausal Bleeding: No  Hormone Replacement Therapy: No    Pap History  Last pap date: 23  Result: Normal (NIL, Negative HPV)  History of Abnormal Pap: (!) Yes (Pap 2022 ASCUS +HR HPV with benign colpo. HX OF CIN3; LEEP 2017)  Treatment used: Colpo, LEEP  HPV Vaccine Completed: No    [place]  Sexually Active: No  STI History: No  Contraception: No    Breast History  Last Breast Imaging Date: Yes  Date: 22 (MMG & US; Referred to Dr. Alvarez; WNL per pt)  History of Abnormal Breast Imaging : No  History of Breast Biopsy: No      Past Medical History:   Diagnosis Date    Abnormal Pap smear of cervix     GERD (gastroesophageal reflux disease)     High grade squamous intraepithelial lesion (HGSIL), grade 3 GEORGETTE, on biopsy of cervix     Injury to breast     Lump of right breast      Past Surgical History:   Procedure Laterality Date    BILATERAL TUBAL LIGATION Bilateral 1995    CERVICAL BIOPSY  W/ LOOP ELECTRODE EXCISION  2017    COLPOSCOPY  2023    Dr. Isac Don    Cone biopsy of cervix Bilateral 2017    Dr. Gonzalo Don     Endocervical biopsy  2017    Dr. Gonzalo Don    HYSTEROSCOPY WITH DILATION AND CURETTAGE OF UTERUS N/A 2023    Procedure: HYSTEROSCOPY, WITH DILATION AND CURETTAGE OF UTERUS;  Surgeon: Isac Don MD;  Location: [facility];  Service: OB/GYN;  Laterality: N/A;       Current Outpatient Medications:     famotidine (PEPCID) 10 MG tablet, Take 10 mg by mouth 2 (two) times daily., Disp: , Rfl:   Review of patient's allergies indicates:  No Known Allergies  OB History    Para Term  AB Living   1 1 1     1   SAB IAB Ectopic Multiple Live Births           1      # Outcome Date GA Lbr Migel/2nd Weight Sex Type Anes PTL Lv   1 Term 80 40w0d  3.118 kg (6 lb 14 oz) F Vag-Spont None N JUSTO     Social History     Tobacco Use    Smoking status: Never    Smokeless tobacco: Never   Substance Use Topics    Alcohol use: Never    Drug use: Never     Family History   Problem Relation Name Age of Onset    Heart failure Mother      Pancreatic cancer Sister TREY     Ovarian cancer Sister QUANITA 40    Breast cancer Sister QUANITA 65    Colon cancer Neg Hx      Cervical cancer Neg Hx         Review of Systems:   Review of Systems   Constitutional:  Negative for appetite change, chills, fatigue, fever and unexpected weight change.   Eyes:  Negative for visual disturbance.   Respiratory:  Negative for cough, shortness of breath and wheezing.    Cardiovascular:  Negative for chest pain, palpitations and leg swelling.   Gastrointestinal:  Negative for abdominal pain, bloating, blood in stool, constipation, diarrhea, nausea, vomiting, reflux and fecal incontinence.   Endocrine: Negative for hair loss and hot flashes.   Genitourinary:  Negative for bladder incontinence, decreased libido, dysmenorrhea, dyspareunia, dysuria, flank pain, frequency, genital sores, hematuria, hot flashes, menorrhagia, menstrual problem, pelvic pain, urgency, vaginal bleeding, vaginal discharge, vaginal pain, urinary incontinence,  "postcoital bleeding, postmenopausal bleeding, vaginal dryness and vaginal odor.   Integumentary:  Negative for rash, acne, hair changes, breast mass, nipple discharge, breast skin changes and breast tenderness.   Neurological:  Negative for headaches.   Psychiatric/Behavioral:  Negative for depression.    Breast: Negative for asymmetry, breast self exam, lump, mass, mastodynia, nipple discharge, skin changes and tenderness       Physical Exam:  /76 (BP Location: Left arm, Patient Position: Sitting)   Ht 5' 1.81" (1.57 m)   Wt 105.2 kg (232 lb)   BMI 42.69 kg/m²       Physical Exam:   Constitutional: She is oriented to person, place, and time. She appears well-developed and well-nourished.    HENT:   Head: Normocephalic.      Cardiovascular:       Exam reveals no edema.        Pulmonary/Chest: Effort normal. She exhibits no mass, no tenderness, no bony tenderness, no deformity and no retraction. Right breast exhibits no inverted nipple, no mass, no nipple discharge, no skin change, no tenderness, no bleeding, no swelling, no mastectomy, no augmentation and no lumpectomy. Left breast exhibits no inverted nipple, no mass, no nipple discharge, no skin change, no tenderness, no bleeding, no swelling, no mastectomy, no augmentation and no lumpectomy. Breasts are symmetrical.        Abdominal: Soft. She exhibits no distension and no mass. There is no abdominal tenderness. There is no rebound and no guarding. No hernia. Hernia confirmed negative in the right inguinal area.     Genitourinary:    Inguinal canal, vagina, uterus, right adnexa, left adnexa and rectum normal.   Rectum:      No anal fissure or external hemorrhoid.   The external female genitalia was normal.   No external genitalia lesions identified,Genitalia hair distrobution normal .     Labial bartholins normal.There is no rash, tenderness, lesion or injury on the right labia. There is no rash, tenderness, lesion or injury on the left labia. Cervix is " normal. No no masses or organomegaly. Right adnexum displays no mass, no tenderness and no fullness. Left adnexum displays no mass, no tenderness and no fullness. Vagina exhibits no lesion. No erythema, vaginal discharge, tenderness, bleeding, rectocele, cystocele or prolapse of vaginal walls in the vagina.    No foreign body in the vagina.      No signs of injury in the vagina.   Vagina was moist.Cervix exhibits no motion tenderness, no lesion, no discharge, no friability, no tenderness and no polyp. Uterus consistancy normal and Uerus contour normal  Uterus is not deviated, not enlarged, not fixed, not tender, not hosting fibroids and no uterine prolapse. Normal urethral meatus.Urethral Meatus exhibits: no urethral lesionUrethra findings: no urethral mass, no tenderness and no prolapsedBladder findings: no bladder tenderness          Musculoskeletal: Normal range of motion.      Lymphadenopathy: No inguinal adenopathy noted on the right or left side.    Neurological: She is alert and oriented to person, place, and time.    Skin: Skin is warm and dry.    Psychiatric: She has a normal mood and affect. Her behavior is normal. Judgment and thought content normal.        Assessment:   Annual Well Women Exam  1. Encounter for well woman exam with abnormal findings  - Liquid-Based Pap Smear, Screening    2. BMI 40.0-44.9, adult    3. History of postmenopausal bleeding  - medroxyPROGESTERone (PROVERA) 10 MG tablet; Take 1 tablet (10 mg total) by mouth once daily. for 10 days  Dispense: 10 tablet; Refill: 0        Plan:  Pap w HPV  Breast Self-awareness  Recommend annual mammogram  Recommend exercise at least 3 times weekly  Healthy, balanced diet  Keep yearly follow up with PCP    Recommend another Provera challenge.  If she does have withdrawal bleeding we will consider hysterectomy for continued postop menopausal bleeding.  She agrees  Rx: provear 10 mg PO daily x 10 days    Will send referral to [facility] for Orthopedic  evaluation   Desires referral to Stephanie Ernandez to establish Gyn care     RTC 1 month medication f/u     This note was transcribed by Edelmira Degroot. There may be transcription errors as a result, however minimal. Effort has been made to ensure accuracy of transcription, but any obvious errors or omissions should be clarified with the author of the document.       I agree with the above documentation.

## 2025-01-08 ENCOUNTER — OFFICE VISIT (OUTPATIENT)
Dept: OBSTETRICS AND GYNECOLOGY | Facility: CLINIC | Age: 65
End: 2025-01-08
Payer: MEDICAID

## 2025-01-08 VITALS
BODY MASS INDEX: 42.69 KG/M2 | DIASTOLIC BLOOD PRESSURE: 76 MMHG | SYSTOLIC BLOOD PRESSURE: 118 MMHG | WEIGHT: 232 LBS | HEIGHT: 62 IN

## 2025-01-08 DIAGNOSIS — Z87.42 HISTORY OF POSTMENOPAUSAL BLEEDING: ICD-10-CM

## 2025-01-08 DIAGNOSIS — Z01.411 ENCOUNTER FOR WELL WOMAN EXAM WITH ABNORMAL FINDINGS: Primary | ICD-10-CM

## 2025-01-08 PROCEDURE — 87624 HPV HI-RISK TYP POOLED RSLT: CPT | Performed by: OBSTETRICS & GYNECOLOGY

## 2025-01-08 PROCEDURE — 1159F MED LIST DOCD IN RCRD: CPT | Mod: CPTII,,, | Performed by: OBSTETRICS & GYNECOLOGY

## 2025-01-08 PROCEDURE — 3008F BODY MASS INDEX DOCD: CPT | Mod: CPTII,,, | Performed by: OBSTETRICS & GYNECOLOGY

## 2025-01-08 PROCEDURE — 99396 PREV VISIT EST AGE 40-64: CPT | Mod: ,,, | Performed by: OBSTETRICS & GYNECOLOGY

## 2025-01-08 PROCEDURE — 3074F SYST BP LT 130 MM HG: CPT | Mod: CPTII,,, | Performed by: OBSTETRICS & GYNECOLOGY

## 2025-01-08 PROCEDURE — 3078F DIAST BP <80 MM HG: CPT | Mod: CPTII,,, | Performed by: OBSTETRICS & GYNECOLOGY

## 2025-01-08 RX ORDER — MEDROXYPROGESTERONE ACETATE 10 MG/1
10 TABLET ORAL DAILY
Qty: 10 TABLET | Refills: 0 | Status: CANCELLED | OUTPATIENT
Start: 2025-01-08 | End: 2026-01-08

## 2025-01-08 RX ORDER — MEDROXYPROGESTERONE ACETATE 10 MG/1
10 TABLET ORAL DAILY
Qty: 10 TABLET | Refills: 0 | Status: SHIPPED | OUTPATIENT
Start: 2025-01-08 | End: 2025-01-18

## 2025-01-17 DIAGNOSIS — M25.512 CHRONIC LEFT SHOULDER PAIN: Primary | ICD-10-CM

## 2025-01-17 DIAGNOSIS — G89.29 CHRONIC LEFT SHOULDER PAIN: Primary | ICD-10-CM

## 2025-01-17 DIAGNOSIS — Z76.89 ENCOUNTER TO ESTABLISH CARE: ICD-10-CM

## 2025-02-06 ENCOUNTER — OFFICE VISIT (OUTPATIENT)
Dept: OBSTETRICS AND GYNECOLOGY | Facility: CLINIC | Age: 65
End: 2025-02-06
Payer: MEDICAID

## 2025-02-06 VITALS
SYSTOLIC BLOOD PRESSURE: 126 MMHG | DIASTOLIC BLOOD PRESSURE: 70 MMHG | BODY MASS INDEX: 43.61 KG/M2 | WEIGHT: 231 LBS | HEIGHT: 61 IN

## 2025-02-06 DIAGNOSIS — N95.0 PMB (POSTMENOPAUSAL BLEEDING): Primary | ICD-10-CM

## 2025-02-06 PROCEDURE — 1159F MED LIST DOCD IN RCRD: CPT | Mod: CPTII,,, | Performed by: OBSTETRICS & GYNECOLOGY

## 2025-02-06 PROCEDURE — 3074F SYST BP LT 130 MM HG: CPT | Mod: CPTII,,, | Performed by: OBSTETRICS & GYNECOLOGY

## 2025-02-06 PROCEDURE — 99213 OFFICE O/P EST LOW 20 MIN: CPT | Mod: ,,, | Performed by: OBSTETRICS & GYNECOLOGY

## 2025-02-06 PROCEDURE — 3008F BODY MASS INDEX DOCD: CPT | Mod: CPTII,,, | Performed by: OBSTETRICS & GYNECOLOGY

## 2025-02-06 PROCEDURE — 3078F DIAST BP <80 MM HG: CPT | Mod: CPTII,,, | Performed by: OBSTETRICS & GYNECOLOGY

## 2025-02-06 RX ORDER — MEDROXYPROGESTERONE ACETATE 10 MG/1
10 TABLET ORAL DAILY
Qty: 10 TABLET | Refills: 11 | Status: SHIPPED | OUTPATIENT
Start: 2025-02-06 | End: 2026-02-06

## 2025-02-06 NOTE — PROGRESS NOTES
"  Chief Complaint   Patient presents with    Follow-up     Here for follow up on provera. Pt states "The amount of bleeding I had over the 6-7 days total would not have filled up even one pad. It was hardly anything."     63 yo F with h/p PMB here to f/u after provera challenge. She did have 46-7 days of very light bleeding after completing the medication.    Per Previous note:   HPI:   Jany Hathaway is a 64 y.o. year old  here for her Annual Exam.  She underwent hysteroscope D&C for endometrial polyp with pathology showing proliferative endometrium in in 2023.  She was placed on cyclic Provera which she took every 3 months twice.  She was follow up in 6 months but was not able to make that appointment.  She did have withdrawal bleeding after each episode of cyclic Provera.  She has had no bleeding since.    /70 (BP Location: Left arm, Patient Position: Sitting)   Ht 5' 1" (1.549 m)   Wt 104.8 kg (231 lb)   BMI 43.65 kg/m²   Gen: NAD      Assessment:   1. PMB (postmenopausal bleeding)  - medroxyPROGESTERone (PROVERA) 10 MG tablet; Take 1 tablet (10 mg total) by mouth once daily.  Dispense: 10 tablet; Refill: 11    Plan:   We discussed options of continued cyclic Provera for endometrial protection versus hysterectomy.  The patient says because of bleeding was so light she would like to continuous cyclic Provera for now.    We will give prescription for Provera 10 mg daily for 10 days per month.    We will have her back in 4 months for follow up  "

## 2025-02-20 ENCOUNTER — OFFICE VISIT (OUTPATIENT)
Dept: FAMILY MEDICINE | Facility: CLINIC | Age: 65
End: 2025-02-20
Payer: MEDICAID

## 2025-02-20 VITALS
OXYGEN SATURATION: 98 % | DIASTOLIC BLOOD PRESSURE: 72 MMHG | SYSTOLIC BLOOD PRESSURE: 128 MMHG | HEART RATE: 68 BPM | WEIGHT: 233.19 LBS | TEMPERATURE: 98 F | BODY MASS INDEX: 42.91 KG/M2 | HEIGHT: 62 IN

## 2025-02-20 DIAGNOSIS — L30.4 INTERTRIGO: ICD-10-CM

## 2025-02-20 DIAGNOSIS — D22.9 MULTIPLE ATYPICAL SKIN MOLES: ICD-10-CM

## 2025-02-20 DIAGNOSIS — Z12.31 SCREENING MAMMOGRAM FOR BREAST CANCER: ICD-10-CM

## 2025-02-20 DIAGNOSIS — Z12.11 SCREENING FOR MALIGNANT NEOPLASM OF COLON: ICD-10-CM

## 2025-02-20 DIAGNOSIS — Z76.89 ENCOUNTER TO ESTABLISH CARE: Primary | ICD-10-CM

## 2025-02-20 DIAGNOSIS — L91.8 MULTIPLE ACQUIRED SKIN TAGS: ICD-10-CM

## 2025-02-20 DIAGNOSIS — J32.9 SINUSITIS, UNSPECIFIED CHRONICITY, UNSPECIFIED LOCATION: ICD-10-CM

## 2025-02-20 DIAGNOSIS — G89.29 CHRONIC LEFT SHOULDER PAIN: ICD-10-CM

## 2025-02-20 DIAGNOSIS — K21.9 GASTROESOPHAGEAL REFLUX DISEASE, UNSPECIFIED WHETHER ESOPHAGITIS PRESENT: ICD-10-CM

## 2025-02-20 DIAGNOSIS — M25.512 CHRONIC LEFT SHOULDER PAIN: ICD-10-CM

## 2025-02-20 PROBLEM — Z80.0 FAMILY HISTORY OF PANCREATIC CANCER: Status: ACTIVE | Noted: 2025-02-20

## 2025-02-20 LAB
25(OH)D3+25(OH)D2 SERPL-MCNC: 21 NG/ML (ref 30–80)
ALBUMIN SERPL-MCNC: 4.1 G/DL (ref 3.4–5)
ALBUMIN/GLOB SERPL: 1.3 RATIO
ALP SERPL-CCNC: 160 UNIT/L (ref 50–144)
ALT SERPL-CCNC: 29 UNIT/L (ref 1–45)
ANION GAP SERPL CALC-SCNC: 8 MEQ/L (ref 2–13)
AST SERPL-CCNC: 30 UNIT/L (ref 14–36)
BASOPHILS # BLD AUTO: 0.03 X10(3)/MCL (ref 0.01–0.08)
BASOPHILS NFR BLD AUTO: 0.4 % (ref 0.1–1.2)
BILIRUB SERPL-MCNC: 0.5 MG/DL (ref 0–1)
BILIRUB UR QL STRIP.AUTO: NEGATIVE
BUN SERPL-MCNC: 12 MG/DL (ref 7–20)
CALCIUM SERPL-MCNC: 8.8 MG/DL (ref 8.4–10.2)
CHLORIDE SERPL-SCNC: 102 MMOL/L (ref 98–110)
CHOLEST SERPL-MCNC: 256 MG/DL (ref 0–200)
CLARITY UR: CLEAR
CO2 SERPL-SCNC: 29 MMOL/L (ref 21–32)
COLOR UR AUTO: YELLOW
CREAT SERPL-MCNC: 0.73 MG/DL (ref 0.66–1.25)
CREAT/UREA NIT SERPL: 16 (ref 12–20)
EOSINOPHIL # BLD AUTO: 0.16 X10(3)/MCL (ref 0.04–0.36)
EOSINOPHIL NFR BLD AUTO: 2.3 % (ref 0.7–7)
ERYTHROCYTE [DISTWIDTH] IN BLOOD BY AUTOMATED COUNT: 12.8 % (ref 11–14.5)
EST. AVERAGE GLUCOSE BLD GHB EST-MCNC: 119.8 MG/DL (ref 70–115)
GFR SERPLBLD CREATININE-BSD FMLA CKD-EPI: >90 ML/MIN/1.73/M2
GLOBULIN SER-MCNC: 3.2 GM/DL (ref 2–3.9)
GLUCOSE SERPL-MCNC: 103 MG/DL (ref 70–115)
GLUCOSE UR QL STRIP: NEGATIVE
HBA1C MFR BLD: 5.8 % (ref 4–6)
HCT VFR BLD AUTO: 40.7 % (ref 36–48)
HDLC SERPL-MCNC: 57 MG/DL (ref 40–60)
HGB BLD-MCNC: 13.3 G/DL (ref 11.8–16)
HGB UR QL STRIP: NEGATIVE
IMM GRANULOCYTES # BLD AUTO: 0.02 X10(3)/MCL (ref 0–0.03)
IMM GRANULOCYTES NFR BLD AUTO: 0.3 % (ref 0–0.5)
KETONES UR QL STRIP: NEGATIVE
LDLC SERPL DIRECT ASSAY-SCNC: 155.9 MG/DL (ref 30–100)
LEUKOCYTE ESTERASE UR QL STRIP: NEGATIVE
LYMPHOCYTES # BLD AUTO: 1.29 X10(3)/MCL (ref 1.16–3.74)
LYMPHOCYTES NFR BLD AUTO: 18.8 % (ref 20–55)
MCH RBC QN AUTO: 27.3 PG (ref 27–34)
MCHC RBC AUTO-ENTMCNC: 32.7 G/DL (ref 31–37)
MCV RBC AUTO: 83.6 FL (ref 79–99)
MONOCYTES # BLD AUTO: 0.42 X10(3)/MCL (ref 0.24–0.36)
MONOCYTES NFR BLD AUTO: 6.1 % (ref 4.7–12.5)
NEUTROPHILS # BLD AUTO: 4.96 X10(3)/MCL (ref 1.56–6.13)
NEUTROPHILS NFR BLD AUTO: 72.1 % (ref 37–73)
NITRITE UR QL STRIP: NEGATIVE
NRBC BLD AUTO-RTO: 0 %
PH UR STRIP: 7.5 [PH]
PLATELET # BLD AUTO: 277 X10(3)/MCL (ref 140–371)
PMV BLD AUTO: 10.6 FL (ref 9.4–12.4)
POTASSIUM SERPL-SCNC: 4.7 MMOL/L (ref 3.5–5.1)
PROT SERPL-MCNC: 7.3 GM/DL (ref 6.3–8.2)
PROT UR QL STRIP: NEGATIVE
RBC # BLD AUTO: 4.87 X10(6)/MCL (ref 4–5.1)
SODIUM SERPL-SCNC: 139 MMOL/L (ref 136–145)
SP GR UR STRIP.AUTO: 1.02 (ref 1–1.03)
TRIGL SERPL-MCNC: 105 MG/DL (ref 30–200)
TSH SERPL-ACNC: 2.22 UIU/ML (ref 0.36–3.74)
UROBILINOGEN UR STRIP-ACNC: 0.2
WBC # BLD AUTO: 6.88 X10(3)/MCL (ref 4–11.5)

## 2025-02-20 PROCEDURE — 86803 HEPATITIS C AB TEST: CPT | Performed by: NURSE PRACTITIONER

## 2025-02-20 PROCEDURE — 83036 HEMOGLOBIN GLYCOSYLATED A1C: CPT | Performed by: NURSE PRACTITIONER

## 2025-02-20 PROCEDURE — 80061 LIPID PANEL: CPT | Performed by: NURSE PRACTITIONER

## 2025-02-20 PROCEDURE — 85025 COMPLETE CBC W/AUTO DIFF WBC: CPT | Performed by: NURSE PRACTITIONER

## 2025-02-20 PROCEDURE — 82306 VITAMIN D 25 HYDROXY: CPT | Performed by: NURSE PRACTITIONER

## 2025-02-20 PROCEDURE — 80053 COMPREHEN METABOLIC PANEL: CPT | Performed by: NURSE PRACTITIONER

## 2025-02-20 PROCEDURE — 36415 COLL VENOUS BLD VENIPUNCTURE: CPT | Performed by: NURSE PRACTITIONER

## 2025-02-20 PROCEDURE — 81003 URINALYSIS AUTO W/O SCOPE: CPT | Performed by: NURSE PRACTITIONER

## 2025-02-20 PROCEDURE — 84443 ASSAY THYROID STIM HORMONE: CPT | Performed by: NURSE PRACTITIONER

## 2025-02-20 RX ORDER — AMOXICILLIN AND CLAVULANATE POTASSIUM 875; 125 MG/1; MG/1
1 TABLET, FILM COATED ORAL EVERY 12 HOURS
Qty: 20 TABLET | Refills: 0 | Status: SHIPPED | OUTPATIENT
Start: 2025-02-20 | End: 2025-03-02

## 2025-02-20 NOTE — ASSESSMENT & PLAN NOTE
Labs today, will discuss further at follow up  Body mass index is 42.91 kg/m².  Goal BMI <30.  Exercise 5 times a week for 30 minutes per day.  Drink a minimum of 64 ounces of water a day.  Avoid soda, simple sugars, excessive rice, potatoes, and bread. Limit fast foods and fried foods.  Choose complex carbs in moderation (example: green vegetables, beans, oatmeal). Eat plenty of fresh fruits and vegetables with lean meats daily.  Do not skip meals. Encouraged smaller portion sizes.  Avoid fad diets. Consider implementing sustainable healthy lifestyle changes.

## 2025-02-20 NOTE — ASSESSMENT & PLAN NOTE
Counseled on keeping skin dry  Drying with hair drier after shower/bath  Using skin barrier, ie. Creams or pads to protect the skin.  Start lotrisone bid, RTC SWOP

## 2025-02-20 NOTE — PROGRESS NOTES
Patient ID: [id number]     Chief Complaint: Establish Care      Subjective     Jany Hathaway is a 64 y.o. female in the office for [facility]      History of Present Illness    CHIEF COMPLAINT:  Patient presents today for a general check up and weight management concerns. She's also wanting PT closer to home for her left shoulder pain and someone to remove numerous skin tags.    WEIGHT MANAGEMENT:  She reports difficulty feeling satiated after meals. 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 Current physical activity includes recreational dancing and walking her dog, though she notes the dog walking provides minimal exercise due to frequent stops.    MEDICAL HISTORY:  She has a history of heartburn and elevated cholesterol first noted in 2010.    FAMILY HISTORY OF CANCER:  She has three sisters with cancer history: one with lung and breast cancer, another with ovarian cancer in 1980s and recent breast cancer, and a third who passed away in 2010 from stage 4 pancreatic cancer discovered during gallbladder surgery. Her maternal aunt has a history of surviving lung cancer. Her mother passed away from congestive heart failure at advanced age.    IMAGING AND GENETIC TESTING:  She had a previous abnormal mammogram due to trauma from falling and striking breast on a curb. Follow-up imaging studies in [place] were negative, with no biopsy indicated. Genetic testing with Common Hereditary Cancers Panel completed on December 17, 2022, was negative.      ROS:  ROS as indicated in HPI.         Past Medical History:  has a past medical history of Abnormal Pap smear of cervix, GERD (gastroesophageal reflux disease), High grade squamous intraepithelial lesion (HGSIL), grade 3 GEORGETTE, on biopsy of cervix, Injury to breast, and Lump of right breast.    Social History:  reports that she has never smoked. She has never used smokeless tobacco. She reports that she does not drink alcohol  "and does not use drugs.    Current Outpatient Medications   Medication Instructions    amoxicillin-clavulanate 875-125mg (AUGMENTIN) 875-125 mg per tablet 1 tablet, Oral, Every 12 hours    famotidine (PEPCID) 10 mg, 2 times daily    medroxyPROGESTERone (PROVERA) 10 mg, Oral, Daily       Patient has no known allergies.     Patient Care Team:  Stephanie Ernandez FNP-C as PCP - General (Family Medicine)  Isac Don MD as Consulting Physician (Obstetrics and Gynecology)  [facility]     Objective     Visit Vitals  /72 (BP Location: Left arm, Patient Position: Sitting)   Pulse 68   Temp 97.9 °F (36.6 °C) (Temporal)   Ht 5' 1.81" (1.57 m)   Wt 105.8 kg (233 lb 3.2 oz)   SpO2 98%   BMI 42.91 kg/m²       Physical Exam  Vitals reviewed.   Constitutional:       General: She is not in acute distress.     Appearance: She is obese. She is not ill-appearing.   HENT:      Head: Normocephalic.      Nose: Nose normal. No congestion or rhinorrhea.      Mouth/Throat:      Mouth: Mucous membranes are moist.      Pharynx: Oropharynx is clear.   Eyes:      Pupils: Pupils are equal, round, and reactive to light.   Cardiovascular:      Rate and Rhythm: Normal rate and regular rhythm.      Pulses: Normal pulses.      Heart sounds: Normal heart sounds.   Pulmonary:      Effort: Pulmonary effort is normal.      Breath sounds: Normal breath sounds.   Abdominal:      General: Bowel sounds are normal.      Palpations: Abdomen is soft.      Tenderness: There is no abdominal tenderness.   Musculoskeletal:      Left shoulder: Tenderness present. Decreased range of motion.      Cervical back: Normal range of motion and neck supple. No tenderness.      Right lower leg: No edema.      Left lower leg: No edema.   Lymphadenopathy:      Cervical: No cervical adenopathy.   Skin:     General: Skin is warm and dry.      Capillary Refill: Capillary refill takes less than 2 seconds.      Findings: Rash present.      Comments: Erythema in abdominal " folds, foul odor. Too numerous to count skin tags to neck and trunk.  Several abnormal moles to back and face.    Neurological:      Mental Status: She is alert and oriented to person, place, and time. Mental status is at baseline.      Gait: Gait normal.   Psychiatric:         Mood and Affect: Mood normal.         Behavior: Behavior normal.         Assessment & Plan     1. Encounter to establish care  -     Ambulatory referral/consult to Family Practice  -     CBC Auto Differential; Future; Expected date: 02/20/2025  -     Comprehensive Metabolic Panel; Future; Expected date: 02/20/2025  -     Lipid Panel; Future; Expected date: 02/20/2025  -     Hemoglobin A1C; Future; Expected date: 02/20/2025  -     TSH; Future; Expected date: 02/20/2025  -     Vitamin D; Future; Expected date: 02/20/2025  -     HCV Antibody RFX to Quant PCR; Future; Expected date: 02/20/2025  -     Urinalysis; Future; Expected date: 02/20/2025    2. Screening mammogram for breast cancer  -     Mammo Digital Screening Bilat w/ Kris (XPD); Future; Expected date: 02/20/2025    3. Screening for malignant neoplasm of colon  -     Cologuard Screening (Multitarget Stool DNA); Future; Expected date: 02/20/2025    4. BMI 40.0-44.9, adult  Overview:  Desires appetite suppression.   Works from home but walks her dog daily.    Assessment & Plan:  Labs today, will discuss further at follow up  Body mass index is 42.91 kg/m².  Goal BMI <30.  Exercise 5 times a week for 30 minutes per day.  Drink a minimum of 64 ounces of water a day.  Avoid soda, simple sugars, excessive rice, potatoes, and bread. Limit fast foods and fried foods.  Choose complex carbs in moderation (example: green vegetables, beans, oatmeal). Eat plenty of fresh fruits and vegetables with lean meats daily.  Do not skip meals. Encouraged smaller portion sizes.  Avoid fad diets. Consider implementing sustainable healthy lifestyle changes.         5. Multiple acquired skin tags  -      Ambulatory referral/consult to Dermatology    6. Gastroesophageal reflux disease, unspecified whether esophagitis present  Overview:  On pepcid bid      7. Chronic left shoulder pain  -     Ambulatory Referral/Consult to Physical Therapy/Occupational Therapy; Future; Expected date: 02/27/2025    8. Multiple atypical skin moles  -     Ambulatory referral/consult to Dermatology    9. Intertrigo  Assessment & Plan:  Counseled on keeping skin dry  Drying with hair drier after shower/bath  Using skin barrier, ie. Creams or pads to protect the skin.  Start lotrisone bid, RTC SWOP        10. Sinusitis, unspecified chronicity, unspecified location  -     amoxicillin-clavulanate 875-125mg (AUGMENTIN) 875-125 mg per tablet; Take 1 tablet by mouth every 12 (twelve) hours. for 10 days  Dispense: 20 tablet; Refill: 0         Assessment & Plan    Z68.41 BMI 40.0-44.9, adult  Z76.89 Encounter to establish care  Z12.31 Screening mammogram for breast cancer  Z12.11 Screening for malignant neoplasm of colon  L91.8 Multiple acquired skin tags  K21.9 Gastroesophageal reflux disease, unspecified whether esophagitis present  M25.512, G89.29 Chronic left shoulder pain  D22.9 Multiple atypical skin moles  L30.4 Intertrigo    IMPRESSION:  - Reviewed patient's limited medical history, noting only occasional heartburn  - Considered family history of various cancers (lung, breast, ovarian, and pancreatic)  - Evaluated results of previous genetic testing ([employer] Common Hereditary Cancers Panel from December 2022), which was negative  - Assessed need for comprehensive labs due to previously elevated cholesterol levels and lack of recent workup  - Determined colon cancer screening appropriate, opting for at-home Cologuard test due to absence of family history of colon cancer  - Planned basic labs panel to assess kidney function, screen for hepatitis, check vitamin D levels, and evaluate for diabetes and cholesterol levels    Z76.89 ENCOUNTER TO  ESTABLISH CARE:  - Ordered comprehensive labs including CBC, CMP, lipid panel, and vitamin D level.  - Requested urinalysis for routine screening.  - Initiated hepatitis screening as part of the initial health assessment.    Z12.11 SCREENING FOR MALIGNANT NEOPLASM OF COLON:  - Educated the patient about the purpose and process of the at-home Cologuard test for colorectal cancer screening.  - Clarified that a positive Cologuard result necessitates a follow-up colonoscopy and does not definitively diagnose cancer.  - Ordered Cologuard at-home colorectal cancer screening test.  - Instructed the patient to follow up when the Cologuard test kit is delivered for further instructions on proper usage.        Follow up for 1), 2 wk, Results and skin tag removals, 2), 1 yr, Wellness, fasting labs prior. In addition to their next scheduled appointment, the patient has also been instructed to follow up on as needed basis.     Future Appointments   Date Time Provider Department Center   3/7/2025 10:30 AM Stephanie Ernandez FNP-C [facility] [place] [facility]   3/12/2025  1:10 PM Isac Don MD [facility] [place] [facility]   3/20/2025  1:20 PM Cristina Kenney NP [facility] [place]   2/19/2026  8:20 AM LAB, [facility] LABORATORY DRAW STATION [facility] SYD[facility] [place] [facility]   2/26/2026 10:00 AM Stephanie Ernandez FNP-C [facility] [place] [facility]        This note was generated with the assistance of ambient listening technology. Verbal consent was obtained by the patient and accompanying visitor(s) for the recording of patient appointment to facilitate this note. I attest to having reviewed and edited the generated note for accuracy, though some syntax or spelling errors may persist. Please contact the author of this note for any clarification.

## 2025-02-21 LAB — HCV AB SERPL QL IA: NONREACTIVE

## 2025-02-24 ENCOUNTER — PATIENT MESSAGE (OUTPATIENT)
Dept: FAMILY MEDICINE | Facility: CLINIC | Age: 65
End: 2025-02-24
Payer: MEDICAID

## 2025-02-24 ENCOUNTER — RESULTS FOLLOW-UP (OUTPATIENT)
Dept: FAMILY MEDICINE | Facility: CLINIC | Age: 65
End: 2025-02-24
Payer: MEDICAID

## 2025-02-25 ENCOUNTER — HOSPITAL ENCOUNTER (OUTPATIENT)
Dept: RADIOLOGY | Facility: HOSPITAL | Age: 65
Discharge: HOME | End: 2025-02-25
Attending: NURSE PRACTITIONER
Payer: MEDICAID

## 2025-02-25 DIAGNOSIS — Z12.31 SCREENING MAMMOGRAM FOR BREAST CANCER: ICD-10-CM

## 2025-02-25 PROCEDURE — 77063 BREAST TOMOSYNTHESIS BI: CPT | Mod: TC

## 2025-02-26 ENCOUNTER — TELEPHONE (OUTPATIENT)
Dept: FAMILY MEDICINE | Facility: CLINIC | Age: 65
End: 2025-02-26
Payer: MEDICAID

## 2025-02-27 ENCOUNTER — TELEPHONE (OUTPATIENT)
Dept: OBSTETRICS AND GYNECOLOGY | Facility: CLINIC | Age: 65
End: 2025-02-27
Payer: MEDICAID

## 2025-03-06 NOTE — TELEPHONE ENCOUNTER
----- Message from COCO Fernandes sent at 3/5/2025  5:41 PM CST -----  Negative Cologuard. Repeat in 3 years.   ----- Message -----  From: Lab, Background User  Sent: 2/20/2025  11:57 AM CST  To: COCO Henderson

## 2025-03-07 ENCOUNTER — OFFICE VISIT (OUTPATIENT)
Dept: FAMILY MEDICINE | Facility: CLINIC | Age: 65
End: 2025-03-07
Payer: MEDICAID

## 2025-03-07 VITALS
OXYGEN SATURATION: 98 % | DIASTOLIC BLOOD PRESSURE: 80 MMHG | WEIGHT: 234 LBS | BODY MASS INDEX: 43.06 KG/M2 | TEMPERATURE: 97 F | HEIGHT: 62 IN | HEART RATE: 72 BPM | SYSTOLIC BLOOD PRESSURE: 122 MMHG

## 2025-03-07 DIAGNOSIS — E78.2 MIXED HYPERLIPIDEMIA: ICD-10-CM

## 2025-03-07 DIAGNOSIS — L30.4 INTERTRIGO: ICD-10-CM

## 2025-03-07 DIAGNOSIS — L91.8 MULTIPLE ACQUIRED SKIN TAGS: Primary | ICD-10-CM

## 2025-03-07 PROCEDURE — 3044F HG A1C LEVEL LT 7.0%: CPT | Mod: CPTII,,, | Performed by: NURSE PRACTITIONER

## 2025-03-07 PROCEDURE — 3079F DIAST BP 80-89 MM HG: CPT | Mod: CPTII,,, | Performed by: NURSE PRACTITIONER

## 2025-03-07 PROCEDURE — 1159F MED LIST DOCD IN RCRD: CPT | Mod: CPTII,,, | Performed by: NURSE PRACTITIONER

## 2025-03-07 PROCEDURE — 11200 RMVL SKIN TAGS UP TO&INC 15: CPT | Mod: ,,, | Performed by: NURSE PRACTITIONER

## 2025-03-07 PROCEDURE — 3074F SYST BP LT 130 MM HG: CPT | Mod: CPTII,,, | Performed by: NURSE PRACTITIONER

## 2025-03-07 PROCEDURE — 99214 OFFICE O/P EST MOD 30 MIN: CPT | Mod: 25,,, | Performed by: NURSE PRACTITIONER

## 2025-03-07 PROCEDURE — 3008F BODY MASS INDEX DOCD: CPT | Mod: CPTII,,, | Performed by: NURSE PRACTITIONER

## 2025-03-07 PROCEDURE — 1160F RVW MEDS BY RX/DR IN RCRD: CPT | Mod: CPTII,,, | Performed by: NURSE PRACTITIONER

## 2025-03-07 NOTE — ASSESSMENT & PLAN NOTE
Borderline risk.  Encouraged diet, exercise.  Repeat in 1 year.   Lab Results   Component Value Date    CHOL 256 (H) 02/20/2025    HDL 57 02/20/2025    LDLDIRECT 155.9 (H) 02/20/2025    TRIG 105 02/20/2025         LDL Goal: less than 100   Educated on dietary modifications. Follow a low cholesterol, low saturated fat diet with less that 200mg of cholesterol a day.  Avoid fried foods and high saturated fats.  Increase dietary fiber.  Regular exercise can reduce LDL (bad cholesterol) and raise HDL (good cholesterol). Encourage physical activity 5 times per week for 30 minutes per day.

## 2025-03-07 NOTE — PROGRESS NOTES
"Patient ID: [id number]     Chief Complaint: Follow-up      HPI:     Jany Hathaway is a 64 y.o. female in the office for Follow-up  Following up after treated for intertrigo (improved, almost resolved).   She'd also like skin tags removed.  They keep getting caught on jewelry and rubbing on clothing causing irritation, sometimes bleeding.   Lab results reviewed and discussed.     Past Medical History:  has a past medical history of Abnormal Pap smear of cervix, GERD (gastroesophageal reflux disease), High grade squamous intraepithelial lesion (HGSIL), grade 3 GEORGETTE, on biopsy of cervix, Injury to breast, and Lump of right breast.    Social History:  reports that she has never smoked. She has never used smokeless tobacco. She reports that she does not drink alcohol and does not use drugs.    Current Outpatient Medications   Medication Instructions    famotidine (PEPCID) 10 mg, 2 times daily    medroxyPROGESTERone (PROVERA) 10 mg, Oral, Daily       Patient has no known allergies.     Patient Care Team:  Stephanie Ernandez FNP-C as PCP - General (Family Medicine)  Isac Don MD as Consulting Physician (Obstetrics and Gynecology)  [facility]     Subjective     Review of Systems    See HPI     Objective     Visit Vitals  /80 (BP Location: Left arm)   Pulse 72   Temp 96.8 °F (36 °C) (Temporal)   Ht 5' 1.81" (1.57 m)   Wt 106.1 kg (234 lb)   SpO2 98%   BMI 43.06 kg/m²       Physical Exam  Vitals reviewed.   Constitutional:       General: She is not in acute distress.     Appearance: She is obese. She is not ill-appearing.   HENT:      Head: Normocephalic.      Nose: Nose normal. No congestion or rhinorrhea.      Mouth/Throat:      Mouth: Mucous membranes are moist.      Pharynx: Oropharynx is clear.   Eyes:      Pupils: Pupils are equal, round, and reactive to light.   Cardiovascular:      Rate and Rhythm: Normal rate and regular rhythm.      Pulses: Normal pulses.      Heart sounds: Normal " heart sounds.   Pulmonary:      Effort: Pulmonary effort is normal.      Breath sounds: Normal breath sounds.   Abdominal:      General: Bowel sounds are normal.      Palpations: Abdomen is soft.      Tenderness: There is no abdominal tenderness.   Musculoskeletal:      Left shoulder: Tenderness present. Decreased range of motion.      Cervical back: Normal range of motion and neck supple. No tenderness.      Right lower leg: No edema.      Left lower leg: No edema.   Lymphadenopathy:      Cervical: No cervical adenopathy.   Skin:     General: Skin is warm and dry.      Capillary Refill: Capillary refill takes less than 2 seconds.      Comments: Too numerous to count skin tags to neck and trunk.  Several abnormal moles to back and face.    Neurological:      Mental Status: She is alert and oriented to person, place, and time. Mental status is at baseline.      Gait: Gait normal.   Psychiatric:         Mood and Affect: Mood normal.         Behavior: Behavior normal.         Assessment & Plan:     1. Multiple acquired skin tags  Assessment & Plan:  11 Skin tags removed without incident.       2. Intertrigo  Assessment & Plan:  Significantly improved.  Continue keeping skin dry. RTC PRN      3. Mixed hyperlipidemia  Overview:  The 10-year ASCVD risk score (Maximo DK, et al., 2019) is: 5.2%    Values used to calculate the score:      Age: 64 years      Sex: Female      Is Non- : No      Diabetic: No      Tobacco smoker: No      Systolic Blood Pressure: 122 mmHg      Is BP treated: No      HDL Cholesterol: 57 mg/dL      Total Cholesterol: 256 mg/dL    Assessment & Plan:  Borderline risk.  Encouraged diet, exercise.  Repeat in 1 year.   Lab Results   Component Value Date    CHOL 256 (H) 02/20/2025    HDL 57 02/20/2025    LDLDIRECT 155.9 (H) 02/20/2025    TRIG 105 02/20/2025         LDL Goal: less than 100   Educated on dietary modifications. Follow a low cholesterol, low saturated fat diet with  less that 200mg of cholesterol a day.  Avoid fried foods and high saturated fats.  Increase dietary fiber.  Regular exercise can reduce LDL (bad cholesterol) and raise HDL (good cholesterol). Encourage physical activity 5 times per week for 30 minutes per day.           4. BMI 40.0-44.9, adult  Overview:  Desires appetite suppression.   Works from home but walks her dog daily.    Assessment & Plan:  Body mass index is 43.06 kg/m².  Goal BMI <30.  Exercise 5 times a week for 30 minutes per day.  Drink a minimum of 64 ounces of water a day.  Avoid soda, simple sugars, excessive rice, potatoes, and bread. Limit fast foods and fried foods.  Choose complex carbs in moderation (example: green vegetables, beans, oatmeal). Eat plenty of fresh fruits and vegetables with lean meats daily.  Do not skip meals. Encouraged smaller portion sizes.  Avoid fad diets. Consider implementing sustainable healthy lifestyle changes.            Follow up if symptoms worsen or fail to improve. In addition to their next scheduled appointment, the patient has also been instructed to follow up on as needed basis.     Future Appointments   Date Time Provider Department Center   5/29/2025  1:20 PM Isac Don MD [facility]   2/19/2026  8:20 AM LAB, [facility] LABORATORY DRAW STATION [facility] SYDNEY Morales   2/26/2026 10:00 AM Stephanie Ernandez, [facility] ELDA Morales

## 2025-03-16 NOTE — ASSESSMENT & PLAN NOTE
Body mass index is 43.06 kg/m².  Goal BMI <30.  Exercise 5 times a week for 30 minutes per day.  Drink a minimum of 64 ounces of water a day.  Avoid soda, simple sugars, excessive rice, potatoes, and bread. Limit fast foods and fried foods.  Choose complex carbs in moderation (example: green vegetables, beans, oatmeal). Eat plenty of fresh fruits and vegetables with lean meats daily.  Do not skip meals. Encouraged smaller portion sizes.  Avoid fad diets. Consider implementing sustainable healthy lifestyle changes.

## 2025-05-23 NOTE — PROGRESS NOTES
Chief Complaint     Follow-up (Pt here to follow up on Cyclic provera. Pt reports that she has been taking Provera 10mg every month and now has no bleeding. No c/o today. )    HPI:     Patient is a 64 y.o.  presents today to f/u cyclic Provera due to PMB.  She has used the medicine for the last 3 months and had no withdrawal bleeding each month.    Per previous note 25  65 yo F with h/p PMB here to f/u after provera challenge. She did have 46-7 days of very light bleeding after completing the medication.     Per Previous note:   HPI:   Jany Hathaway is a 64 y.o. year old  here for her Annual Exam.  She underwent hysteroscope D&C for endometrial polyp with pathology showing proliferative endometrium in in 2023.  She was placed on cyclic Provera which she took every 3 months twice.  She was follow up in 6 months but was not able to make that appointment.  She did have withdrawal bleeding after each episode of cyclic Provera.  She has had no bleeding since.           Past Medical History:   Diagnosis Date    Abnormal Pap smear of cervix     GERD (gastroesophageal reflux disease)     High grade squamous intraepithelial lesion (HGSIL), grade 3 GEORGETTE, on biopsy of cervix     Injury to breast     Lump of right breast        Past Surgical History:   Procedure Laterality Date    BILATERAL TUBAL LIGATION Bilateral 1995    CERVICAL BIOPSY  W/ LOOP ELECTRODE EXCISION  2017    COLPOSCOPY  2023    Dr. Isac Don    Cone biopsy of cervix Bilateral 2017    Dr. Gonzalo Don    Endocervical biopsy  2017    Dr. Gonzalo Don    HYSTEROSCOPY WITH DILATION AND CURETTAGE OF UTERUS N/A 2023    Procedure: HYSTEROSCOPY, WITH DILATION AND CURETTAGE OF UTERUS;  Surgeon: Isac Don MD;  Location: [facility] OR;  Service: OB/GYN;  Laterality: N/A;    TUBAL LIGATION         Family History   Problem Relation Name Age of Onset    Heart failure Mother Concha Rodas      "COPD Mother Concha Rodas     Heart disease Mother Concha Rodas     Pancreatic cancer Sister TREY     Cancer Sister TREY     Ovarian cancer Sister JERSON 40    Breast cancer Sister JERSON 65    Cancer Sister JERSON     Cancer Maternal Aunt Arianna Nazario     Colon cancer Neg Hx      Cervical cancer Neg Hx         OB History          1    Para   1    Term   1            AB        Living   1         SAB        IAB        Ectopic        Multiple        Live Births   1                 Medications Ordered Prior to Encounter[1]    Review of Systems:       Review of Systems   Constitutional:  Negative for chills and fever.   Gastrointestinal:  Negative for abdominal pain, constipation and diarrhea.   Genitourinary:  Negative for bladder incontinence, decreased libido, dysmenorrhea, dyspareunia, dysuria, flank pain, frequency, genital sores, hematuria, hot flashes, menorrhagia, menstrual problem, pelvic pain, urgency, vaginal bleeding, vaginal discharge, vaginal pain, urinary incontinence, postcoital bleeding, postmenopausal bleeding, vaginal dryness and vaginal odor.        Physical Exam:    /80 (BP Location: Right arm, Patient Position: Sitting)   Ht 5' 1.81" (1.57 m)   Wt 105.8 kg (233 lb 3.2 oz)   BMI 42.92 kg/m²     Physical Exam   General Exam:    General Appearance: alert, in no acute distress, normal, well nourished.  Psych:  Orientation: time, place, person.  Mood/Affect: Normal     Assessment:   1. PMB (postmenopausal bleeding)             Plan:     We will have her do cyclic Provera once a quarter for the next 6 months.    RTC December    This note was transcribed by Edelmira Degroot. There may be transcription errors as a result, however minimal. Effort has been made to ensure accuracy of transcription, but any obvious errors or omissions should be clarified with the author of the document.       I agree with the above documentation.            [1]   Current Outpatient " Medications on File Prior to Visit   Medication Sig Dispense Refill    famotidine (PEPCID) 10 MG tablet Take 10 mg by mouth 2 (two) times daily.      medroxyPROGESTERone (PROVERA) 10 MG tablet Take 1 tablet (10 mg total) by mouth once daily. 10 tablet 11     No current facility-administered medications on file prior to visit.

## 2025-05-29 ENCOUNTER — OFFICE VISIT (OUTPATIENT)
Dept: OBSTETRICS AND GYNECOLOGY | Facility: CLINIC | Age: 65
End: 2025-05-29
Payer: MEDICAID

## 2025-05-29 VITALS
BODY MASS INDEX: 42.91 KG/M2 | HEIGHT: 62 IN | WEIGHT: 233.19 LBS | SYSTOLIC BLOOD PRESSURE: 138 MMHG | DIASTOLIC BLOOD PRESSURE: 80 MMHG

## 2025-05-29 DIAGNOSIS — N95.0 PMB (POSTMENOPAUSAL BLEEDING): Primary | ICD-10-CM

## 2025-05-29 PROCEDURE — 3008F BODY MASS INDEX DOCD: CPT | Mod: CPTII,,, | Performed by: OBSTETRICS & GYNECOLOGY

## 2025-05-29 PROCEDURE — 99213 OFFICE O/P EST LOW 20 MIN: CPT | Mod: ,,, | Performed by: OBSTETRICS & GYNECOLOGY

## 2025-05-29 PROCEDURE — 1159F MED LIST DOCD IN RCRD: CPT | Mod: CPTII,,, | Performed by: OBSTETRICS & GYNECOLOGY

## 2025-05-29 PROCEDURE — 3075F SYST BP GE 130 - 139MM HG: CPT | Mod: CPTII,,, | Performed by: OBSTETRICS & GYNECOLOGY

## 2025-05-29 PROCEDURE — 3079F DIAST BP 80-89 MM HG: CPT | Mod: CPTII,,, | Performed by: OBSTETRICS & GYNECOLOGY

## 2025-05-29 PROCEDURE — 3044F HG A1C LEVEL LT 7.0%: CPT | Mod: CPTII,,, | Performed by: OBSTETRICS & GYNECOLOGY

## (undated) DEVICE — SOL NACL IRR 1000ML BTL

## (undated) DEVICE — GLOVE PROTEXIS PI SYN SURG 8.5

## (undated) DEVICE — DEVICE MYOSURE LITE TISS REM

## (undated) DEVICE — DRAPE UNDER BUTTOCKS SUC PORT

## (undated) DEVICE — TUBING SUCTION CONNECTING 10FT

## (undated) DEVICE — SET EXT MACROBORE 15IN

## (undated) DEVICE — JELLY KY LUBRICATING 5G PACKET

## (undated) DEVICE — SEAL LENS SCOPE MYOSURE

## (undated) DEVICE — TRAY CATH URETHRAL FOLEY 16FR

## (undated) DEVICE — PACK BASIC

## (undated) DEVICE — TUBE SET AQUILEX FLUID CONTROL

## (undated) DEVICE — KIT MAJOR SINGLE BASIN

## (undated) DEVICE — TUBE AQUILEX VACUUM SET HI LO

## (undated) DEVICE — DRAPE LEGGINGS CUFF 31X48IN

## (undated) DEVICE — DRESSING TELFA N ADH 3X8IN

## (undated) DEVICE — GOWN POLY REINF BRTH SLV 2XL

## (undated) DEVICE — TRAY DRY SKIN SCRUB PREP